# Patient Record
Sex: MALE | Race: WHITE
[De-identification: names, ages, dates, MRNs, and addresses within clinical notes are randomized per-mention and may not be internally consistent; named-entity substitution may affect disease eponyms.]

---

## 2017-06-02 ENCOUNTER — HOSPITAL ENCOUNTER (EMERGENCY)
Dept: HOSPITAL 53 - M ED | Age: 37
Discharge: HOME | End: 2017-06-02
Payer: COMMERCIAL

## 2017-06-02 VITALS — DIASTOLIC BLOOD PRESSURE: 85 MMHG | SYSTOLIC BLOOD PRESSURE: 153 MMHG

## 2017-06-02 DIAGNOSIS — T18.128A: Primary | ICD-10-CM
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
## 2017-06-02 LAB
ANION GAP SERPL CALC-SCNC: 8 MEQ/L (ref 8–16)
BASOPHILS # BLD AUTO: 0.1 K/MM3 (ref 0–0.2)
BASOPHILS NFR BLD AUTO: 0.8 % (ref 0–1)
BUN SERPL-MCNC: 16 MG/DL (ref 7–18)
CALCIUM SERPL-MCNC: 9.2 MG/DL (ref 8.5–10.1)
CHLORIDE SERPL-SCNC: 104 MEQ/L (ref 98–107)
CO2 SERPL-SCNC: 25 MEQ/L (ref 21–32)
CREAT SERPL-MCNC: 1.03 MG/DL (ref 0.7–1.3)
EOSINOPHIL # BLD AUTO: 0.2 K/MM3 (ref 0–0.5)
EOSINOPHIL NFR BLD AUTO: 2.9 % (ref 0–3)
ERYTHROCYTE [DISTWIDTH] IN BLOOD BY AUTOMATED COUNT: 12.8 % (ref 11.5–14.5)
GFR SERPL CREATININE-BSD FRML MDRD: > 60 ML/MIN/{1.73_M2} (ref 60–?)
GLUCOSE SERPL-MCNC: 107 MG/DL (ref 70–105)
LARGE UNSTAINED CELL #: 0.1 K/MM3 (ref 0–0.4)
LARGE UNSTAINED CELL %: 1.4 % (ref 0–4)
LYMPHOCYTES # BLD AUTO: 1.5 K/MM3 (ref 1.5–4.5)
LYMPHOCYTES NFR BLD AUTO: 22.2 % (ref 24–44)
MCH RBC QN AUTO: 34.5 PG (ref 27–33)
MCHC RBC AUTO-ENTMCNC: 36.9 G/DL (ref 32–36.5)
MCV RBC AUTO: 93.5 FL (ref 80–96)
MONOCYTES # BLD AUTO: 0.5 K/MM3 (ref 0–0.8)
MONOCYTES NFR BLD AUTO: 8.1 % (ref 0–5)
NEUTROPHILS # BLD AUTO: 4.2 K/MM3 (ref 1.8–7.7)
NEUTROPHILS NFR BLD AUTO: 64.6 % (ref 36–66)
PLATELET # BLD AUTO: 190 K/MM3 (ref 150–450)
POTASSIUM SERPL-SCNC: 4.1 MEQ/L (ref 3.5–5.1)
SODIUM SERPL-SCNC: 137 MEQ/L (ref 136–145)
WBC # BLD AUTO: 6.5 K/MM3 (ref 4–10)

## 2017-06-02 NOTE — REP
CHEST, TWO VIEWS:

 

HISTORY: Choked on sausage.

 

COMPARISON: None.

 

FINDINGS: The superior mediastinal structures are midline. The cardiac silhouette

is unremarkable in size, shape and position. The diaphragmatic surfaces of the

lungs are regular and the costophrenic angles are clear. The pulmonary fields are

clear. The imaged osseous structures are intact.

 

IMPRESSION:

 

There is no acute cardiopulmonary disease.

 

 

Signed by

Juan Miguel Valderrama DO 06/02/2017 04:32 P

## 2017-06-03 NOTE — ECGEPIP
Stationary ECG Study

                           [facility] - ED

                                       

                                       Test Date:    2017

Pat Name:     DARLIN DAVIDSON          Department:   

Patient ID:   [id number]                 Room:         -

Gender:       M                        Technician:   JB

:          1980               Requested By: JIM ZAMORA

Order Number: [id number]     Reading MD:   Josie Field

                                 Measurements

Intervals                              Axis          

Rate:         79                       P:            53

SC:           134                      QRS:          16

QRSD:         93                       T:            59

QT:           351                                    

QTc:          403                                    

                           Interpretive Statements

SINUS RHYTHM

NONSPECIFIC T-WAVE ABNORMALITY

NO PRIOR FOR COMPARISON

Electronically Signed On 6-3-2017 7:27:23 EDT by Josie Field

## 2021-10-09 ENCOUNTER — HOSPITAL ENCOUNTER (EMERGENCY)
Dept: HOSPITAL 53 - M ED | Age: 41
LOS: 1 days | Discharge: HOME | End: 2021-10-10
Payer: COMMERCIAL

## 2021-10-09 VITALS — WEIGHT: 305.34 LBS | HEIGHT: 71 IN | BODY MASS INDEX: 42.75 KG/M2

## 2021-10-09 DIAGNOSIS — R03.0: ICD-10-CM

## 2021-10-09 DIAGNOSIS — Z79.899: ICD-10-CM

## 2021-10-09 DIAGNOSIS — R05.9: Primary | ICD-10-CM

## 2021-10-09 DIAGNOSIS — U07.1: ICD-10-CM

## 2021-10-09 PROCEDURE — 71275 CT ANGIOGRAPHY CHEST: CPT

## 2021-10-09 PROCEDURE — 96360 HYDRATION IV INFUSION INIT: CPT

## 2021-10-09 PROCEDURE — 71045 X-RAY EXAM CHEST 1 VIEW: CPT

## 2021-10-09 PROCEDURE — 96361 HYDRATE IV INFUSION ADD-ON: CPT

## 2021-10-09 PROCEDURE — 99284 EMERGENCY DEPT VISIT MOD MDM: CPT

## 2021-10-09 PROCEDURE — 93005 ELECTROCARDIOGRAM TRACING: CPT

## 2021-10-09 PROCEDURE — 84484 ASSAY OF TROPONIN QUANT: CPT

## 2021-10-09 PROCEDURE — 80047 BASIC METABLC PNL IONIZED CA: CPT

## 2021-10-09 NOTE — REP
INDICATION:

sob



COMPARISON:

06/02/2017



TECHNIQUE:

Portable AP view of the chest



FINDINGS:

The mediastinum and cardiac silhouette are stable and within normal limits for

portable technique.  The lung fields are clear without acute consolidation, effusion,

or pneumothorax.  Skeletal structures are intact.



IMPRESSION:

No acute cardiopulmonary process appreciated.





<Electronically signed by Say Trevizo > 10/09/21 [id number]

## 2021-10-09 NOTE — HPEPDOC
General


Date of Admission


10/9/21


Date of Service:  Oct 9, 2021


Chief Complaint


URI type symptoms


Source:  Patient





History of Present Illness


Mr. Reilly is a 41-year-old male with significant medical history of 

prehypertension and obesity.  Patient reports his chief complaints are URI type 

symptoms.  He describes headache, fatigue, malaise, sinus congestion, dry cough 

and mild sensation of shortness of breath with exertion x7 days.  Patient does 

report that he has been in contact with Covid positive individual, his fiance 

on Saturday.  He was tested outpatient at [facility], however it was negative. His 

s/s worsened over the past week and thus he decided to come to ED.  PCR positive

Covid.  





Fortunately, patient is tolerating room air 98%.  His ambulation test on room 

air showed no desaturation. 


Chest x-ray without infiltrate or consolidation.  Patient reports that he does 

not take any medications at home.  He arrives hypertensive 175/103 and 

tachycardic 100-118.  Given his shortness of breath and tachycardia, patient 

underwent a CTA chest to ensure no PE.  Patient reports that he has not seen a 

PCP in several years and in the past he has just been prehypertensive.  





CTA chest confirmed viral pneumonitis and no emboli.  Patient blood pressure did

trend down post /94.  Patient does describe some understandable anxiety 

while in ER.  Patient was found to be febrile T102 Fahrenheit during the 

tachycardia and likely this drove up heart rate.  Patient was given Tylenol and 

temperature decreased and heart rate normalized.  





Patient is interested in monoclonal antibodies.  We will schedule for monoclonal

antibodies and patient encouraged for PCP visit for treatment of his 

hypertension.





Home Medications


Scheduled PRN


Albuterol Sulfate (Proair Hfa) 8.5 Gm Hfa.aer.ad, 2 PUFF INH Q4-6HP PRN for 

wheezing





Allergies


Coded Allergies:  


     No Known Allergies (Unverified , 6/2/17)





Past Medical History


Medical History


Obesity and prehypertension


Surgical History


Denies





Family History


Significant Family History:  Hypertension





Social History


* Smoker:  Denies


Alcohol:  Denies


Drugs:  denies


Recent Travel/Sick Contacts:  Denies: Recent travel, Recent sick contacts


Psychosocial History:  No pertinent psych hx





A-FIB/CHADSVASC


A-FIB History


Current/History of A-Fib/PAF?:  No


Current PO Anticoag Therapy:  No





Review of Systems


Constitutional:  Reports: Fever, Night Sweats, Weakness, Fatigue; 


   Denies: Chills


Eyes:  Denies: Pain, Vision change


ENT:  Reports: Head Aches, Sinus Congestion; 


   Denies: Ear Pain, Dysphagia


Skin:  Denies: Rash, Lesions, Breakdown


Pulmonary:  Reports: Dyspnea, Cough


Cardiovascular:  Denies: Chest Pain, Palpitations, Orthopnea, Paroxysmal Noc. 

Dyspnea, Lt Headedness


Gastrointestinal:  Denies: Nausea, Vomiting, Abdominal Pain, Diarrhea


Genitourinary:  Denies: Dysuria, Frequency, Incontinence, Retention


Hematologic:  Denies: Bruising, Bleeding Excessively


Musculoskeletal:  Denies: Neck Pain, Back Pain, Joint Pain, Muscle Pain, Spasms


Neurological:  Denies: Weakness, Numbness, Change in speech, Confusion


Psych:  Reports: Mood Normal; 


   Denies: Depression, Memory Issues





Physical Examination


General Exam:  Positive: Alert, Cooperative, No Acute Distress


Eye Exam:  Positive: PERRLA, Conjunctiva & lids normal, EOMI; 


   Negative: Sclera icteric


ENT Exam:  Positive: Atraumatic, Mucous membr. moist/pink, Pharynx Normal


Neck Exam:  Positive: Supple; 


   Negative: JVD, thyromegaly


Chest Exam:  Positive: Normal air movement


Heart Exam:  Positive: Tachycardic, Regular Rhythm, Normal S1, Normal S2; 


   Negative: Murmurs, Rubs


Telemetry:  Positive: No significant arrhythmia


Abdomen Exam:  Positive: Normal bowel sounds, Soft; 


   Negative: Tenderness, Hepatospenomegaly


Extremity Exam:  Positive: Normal pulses; 


   Negative: Clubbing, Cyanosis, Edema


Skin Exam:  Positive: Nl turgor and temperature; 


   Negative: Breakdown, Lesion


Neuro Exam:  Positive: Normal Gait, Normal Speech, Cranial Nerves 3-12 NL, 

Reflexes 2+


Psych Exam:  Positive: Mental status NL, Mood NL, Oriented x 3





Vital Signs


Respiratory rate 18, T100.8, heart rate 107, blood pressure 158/94





 Assessment/Plan


1.  Covid positive:


-Monitor pt, respiratory status. 


-Premedications Benadryl and Tylenol


-Monoclonal antibody infusion per protocol 


-PRN solumedrol/ albuterol/ benadryl if pt with reactivity


-Discharge with written instructions for home monitoring once infusion complete 

per protocol





2.  Hypertension: Patient encouraged for follow-up with PCP and to follow low-

sodium heart healthy diet. 





3.  Obesity: Complicates care





DVT prophylaxis: Early ambulation


CODE STATUS: Full


Disposition: Home once infusion complete per policy guidelines





Plan / VTE


VTE Prophylaxis Ordered?:  No


VTE Exclusion Mechanical Proph:  Low Risk for VTE











RASHAD SOTO NP         Oct 9, 2021 23:13


EDUIN HEREDIA MD                Oct 11, 2021 05:13

## 2021-10-09 NOTE — REPVR
PROCEDURE INFORMATION: 

Exam: CTA Chest With Contrast 

Exam date and time: 10/9/2021 8:51 PM 

Age: 41 years old 

Clinical indication: Other: Tachycardia; Additional info: Tachycardia, covid 

R/O pe 



TECHNIQUE: 

Imaging protocol: Computed tomographic angiography of the chest with contrast. 

3D rendering (Not supervised by radiologist): MIP and/or 3D reconstructed 

images were created by the technologist. 

Radiation optimization: All CT scans at this facility use at least one of these 

dose optimization techniques: automated exposure control; mA and/or kV 

adjustment per patient size (includes targeted exams where dose is matched to 

clinical indication); or iterative reconstruction. 

Contrast material: ISOVUE 370; Contrast volume: 75 ml; Contrast route: 

INTRAVENOUS (IV);  



COMPARISON: 

1. CR Chest, 1 view 2021-10-09 17:13 

2. CR Chest, 2 view PA, Lat 2017-06-02 15:41 



FINDINGS: 

Pulmonary arteries: No filling defects in the pulmonary arteries to suggest 

pulmonary emboli. 

Aorta: Unremarkable. No aortic aneurysm. No aortic dissection. 



Lungs: Patchy peripheral lung infiltrates, evidence for early/mild moderate 

atypical viral pneumonitis. 

Pleural spaces: Unremarkable. No pneumothorax. No pleural effusion. 

Heart: Unremarkable. No cardiomegaly. No pericardial effusion. 

Lymph nodes: Mild mediastinal/hilar adenopathy. 



Liver: Enlarged low attenuating liver, evidence of hepatic steatosis. 

Bones/joints: Unremarkable. No acute fracture. 

Soft tissues: Unremarkable. 



IMPRESSION: 

1. Patchy peripheral lung infiltrates, evidence for early/mild moderate 

atypical viral pneumonitis. 

2. No filling defects in the pulmonary arteries to suggest pulmonary emboli. 



Electronically signed by: Gilbert Monk On 10/09/2021  22:56:17 PM

## 2021-10-10 VITALS — SYSTOLIC BLOOD PRESSURE: 140 MMHG | DIASTOLIC BLOOD PRESSURE: 93 MMHG

## 2021-10-10 NOTE — ECGEPIP
[facility] - ED

                                       

                                       Test Date:    2021-10-09

Pat Name:     DARLIN DAVIDSON          Department:   

Patient ID:   [id number]                 Room:         -

Gender:       Male                     Technician:   MB

:          1980               Requested By: RYAN MONTERROSO PA-C.

Order Number: [id number]     Reading MD:   Josie Field

                                 Measurements

Intervals                              Axis          

Rate:         114                      P:            33

ID:           134                      QRS:          -6

QRSD:         82                       T:            53

QT:           308                                    

QTc:          424                                    

                           Interpretive Statements

Sinus tachycardia

increased rate 17

Electronically Signed on 10- 19:11:51 EDT by Josie Field

## 2021-10-11 ENCOUNTER — HOSPITAL ENCOUNTER (INPATIENT)
Dept: HOSPITAL 53 - M ED | Age: 41
LOS: 8 days | Discharge: HOME | DRG: 137 | End: 2021-10-19
Attending: INTERNAL MEDICINE | Admitting: INTERNAL MEDICINE
Payer: COMMERCIAL

## 2021-10-11 ENCOUNTER — HOSPITAL ENCOUNTER (OUTPATIENT)
Dept: HOSPITAL 53 - M OPCLI4PR | Age: 41
Discharge: HOME | End: 2021-10-11
Attending: INTERNAL MEDICINE
Payer: COMMERCIAL

## 2021-10-11 VITALS — DIASTOLIC BLOOD PRESSURE: 68 MMHG | SYSTOLIC BLOOD PRESSURE: 139 MMHG

## 2021-10-11 VITALS — DIASTOLIC BLOOD PRESSURE: 68 MMHG | SYSTOLIC BLOOD PRESSURE: 133 MMHG

## 2021-10-11 VITALS — SYSTOLIC BLOOD PRESSURE: 143 MMHG | DIASTOLIC BLOOD PRESSURE: 80 MMHG

## 2021-10-11 VITALS — WEIGHT: 304.02 LBS | BODY MASS INDEX: 42.56 KG/M2 | HEIGHT: 71 IN

## 2021-10-11 DIAGNOSIS — U07.1: Primary | ICD-10-CM

## 2021-10-11 DIAGNOSIS — E66.9: ICD-10-CM

## 2021-10-11 DIAGNOSIS — J12.82: ICD-10-CM

## 2021-10-11 DIAGNOSIS — I16.0: ICD-10-CM

## 2021-10-11 DIAGNOSIS — Z79.899: ICD-10-CM

## 2021-10-11 DIAGNOSIS — J96.01: ICD-10-CM

## 2021-10-11 DIAGNOSIS — I10: ICD-10-CM

## 2021-10-11 LAB
ALBUMIN SERPL BCG-MCNC: 3.2 GM/DL (ref 3.2–5.2)
ALT SERPL W P-5'-P-CCNC: 51 U/L (ref 12–78)
APTT BLD: 35.3 SECONDS (ref 25.9–37)
BASOPHILS # BLD AUTO: 0 10^3/UL (ref 0–0.2)
BASOPHILS NFR BLD AUTO: 0.2 % (ref 0–1)
BILIRUB SERPL-MCNC: 0.5 MG/DL (ref 0.2–1)
BUN SERPL-MCNC: 15 MG/DL (ref 7–18)
CALCIUM SERPL-MCNC: 7.9 MG/DL (ref 8.5–10.1)
CHLORIDE SERPL-SCNC: 111 MEQ/L (ref 98–107)
CK MB CFR.DF SERPL CALC: 0.48
CK MB SERPL-MCNC: < 1 NG/ML (ref ?–3.6)
CK SERPL-CCNC: 208 U/L (ref 39–308)
CO2 SERPL-SCNC: 21 MEQ/L (ref 21–32)
CREAT SERPL-MCNC: 0.91 MG/DL (ref 0.7–1.3)
CRP SERPL-MCNC: 17.4 MG/DL (ref 0–0.3)
D DIMER PPP DDU-MCNC: 962.64 NG/ML (ref ?–500)
EOSINOPHIL # BLD AUTO: 0 10^3/UL (ref 0–0.5)
EOSINOPHIL NFR BLD AUTO: 0 % (ref 0–3)
FERRITIN SERPL-MCNC: 2451 NG/ML (ref 26–388)
FIBRINOGEN PPP-MCNC: 826 MG/DL (ref 268–480)
GFR SERPL CREATININE-BSD FRML MDRD: > 60 ML/MIN/{1.73_M2} (ref 60–?)
GLUCOSE SERPL-MCNC: 103 MG/DL (ref 70–100)
HCT VFR BLD AUTO: 41.3 % (ref 42–52)
HGB BLD-MCNC: 14.4 G/DL (ref 13.5–17.5)
INR PPP: 1.07
LDH SERPL L TO P-CCNC: 480 U/L (ref 87–241)
LYMPHOCYTES # BLD AUTO: 0.5 10^3/UL (ref 1.5–5)
LYMPHOCYTES NFR BLD AUTO: 10 % (ref 24–44)
MAGNESIUM SERPL-MCNC: 1.9 MG/DL (ref 1.8–2.4)
MCH RBC QN AUTO: 34.3 PG (ref 27–33)
MCHC RBC AUTO-ENTMCNC: 34.9 G/DL (ref 32–36.5)
MCV RBC AUTO: 98.3 FL (ref 80–96)
MONOCYTES # BLD AUTO: 0.2 10^3/UL (ref 0–0.8)
MONOCYTES NFR BLD AUTO: 3.8 % (ref 2–8)
NEUTROPHILS # BLD AUTO: 4 10^3/UL (ref 1.5–8.5)
NEUTROPHILS NFR BLD AUTO: 85.6 % (ref 36–66)
PLATELET # BLD AUTO: 129 10^3/UL (ref 150–450)
POTASSIUM SERPL-SCNC: 3.9 MEQ/L (ref 3.5–5.1)
PROT SERPL-MCNC: 6.8 GM/DL (ref 6.4–8.2)
PROTHROMBIN TIME: 14.3 SECONDS (ref 12.7–14.5)
RBC # BLD AUTO: 4.2 10^6/UL (ref 4.3–6.1)
SODIUM SERPL-SCNC: 140 MEQ/L (ref 136–145)
TROPONIN I SERPL-MCNC: < 0.02 NG/ML (ref ?–0.1)
WBC # BLD AUTO: 4.7 10^3/UL (ref 4–10)

## 2021-10-11 PROCEDURE — 3E0333Z INTRODUCTION OF ANTI-INFLAMMATORY INTO PERIPHERAL VEIN, PERCUTANEOUS APPROACH: ICD-10-PCS | Performed by: EMERGENCY MEDICINE

## 2021-10-11 RX ADMIN — IPRATROPIUM BROMIDE AND ALBUTEROL SCH PUFF: 20; 100 SPRAY, METERED RESPIRATORY (INHALATION) at 18:18

## 2021-10-11 NOTE — IPNPDOC
Text Note


Date of Service


The patient was seen on 10/11/21.





VS,Iona, I+O


VS, Iona, I+O


Laboratory Tests


10/11/21 18:17











Vital Signs








  Date Time  Temp Pulse Resp B/P (MAP) Pulse Ox O2 Delivery O2 Flow Rate FiO2


 


10/11/21 17:05 100.2 104 24 147/89 (108) 90 Room Air  

















EDUIN HEREDIA MD                Oct 11, 2021 20:48

## 2021-10-11 NOTE — ECGEPIP
[facility] - ED

                                       

                                       Test Date:    2021-10-11

Pat Name:     DARLIN DAVIDSON          Department:   

Patient ID:   [id number]                 Room:         -

Gender:       Male                     Technician:   colt

:          1980               Requested By: Maja Lundborg-Gray 

Order Number: [id number]     Reading MD:   Maja Lundborg-Gray

                                 Measurements

Intervals                              Axis          

Rate:         108                      P:            28

IL:           134                      QRS:          -5

QRSD:         88                       T:            28

QT:           320                                    

QTc:          428                                    

                           Interpretive Statements

Sinus tachycardia

Nonspecific ST T wave changes 

 

cw 10/9/21 rate decreased

Nonspecific ST T wave changes

Electronically Signed on 10- 19:36:08 EDT by Maja Lundborg-Gray

## 2021-10-11 NOTE — HPEPDOC
[facility] Medical History & Physical


Date of Admission


Oct 11, 2021


Date of Service:  Oct 11, 2021


Attending Physician:  EDUIN HEREDIA MD





History and Physical


CHIEF COMPLAINT: shortness of breath





HISTORY OF PRESENT ILLNESS:


 is a pleasant 40yo male with notable PMHx of likely undiagnosed HTN, 

obesity, and recent Covid positive dx, who presented to the [facility] ED on the 

afternoon of 10/11/21 for the chief complaint of worsening shortness of breath. 

The patient's recent story dates back to 10/2 when he his fiance received a 

Covid positive test result. During the overnight into 10/3, the patient began to

become symptomatic in the form of a dry cough, sinus congestion, general 

fatigue, dyspnea on exertion, and headache. He presented to the [facility] now urgent 

care on 10/3 and reportedly received a negative PCR Covid test at that time. 

Over the intervening week, the patient's symptoms worsened as his cough remained

nonproductive, but became more frequent causing intermittent episodes of coughin

g fits. The intensity of his shortness of breath increases well, and again was 

most prominent with activity. He also had a intermittent fever that began around

10/710/8, that was usually able to be controlled by ibuprofen and Tylenol. Due 

to his worsening dyspnea on exertion, the patient presented to the ED initially 

on 10/9; it was at this point that he first tested positive for Covid via PCR.  

The patient was saturating at 98% on room air per reports and did not desaturate

significantly with ambulation test.  A chest x-ray did not show any 

consolidation or infiltrate.  The patient was hypertensive (175/103) and 

tachycardic (HR [id number]).  A CTA of the chest was done in the setting of his d

yspnea on exertion and tachycardia, which ruled out pulmonary embolus, but did 

confirm a viral pneumonitis.  After the CTA, patient's pressures improved as did

his anxiety.  The decision was made at that time for patient to be discharged 

home from the ED, but to return for monoclonal antibody administration today, 

10/11.  After the patient returned home Saturday evening, he reported an 

improvement in his breathing and that his fever had broken.  Yesterday (10/10), 

patient seemed to be doing okay, while making concerted effort to rest and push 

oral fluids.  He still had a cough and general malaise but his oxygen was 

consistently staying 93-94% on room air.  As as the evening progressed yester

day, patient's cough worsened and around 5 AM this morning, he reported 

significant dyspnea while getting out of bed.  He had an episode of nonbloody 

emesis and had what felt to be a minor fever.  Prior to coming in for his 10:30 

AM infusion, the patient had some shortness of breath while showering and 

ambulating.  When he arrived for the infusion, administration was delayed 

slightly until his hypoxia improved.  While waiting for the infusion, patient 

had a second episode of nonbloody emesis.  Patient tolerated his monoclonal's 

without any significant issue and returned home.  After nap, patient woke up 

around 4:15 in the afternoon and reportedly his oxygen saturation on room air 

was 86% while lying down, improving to 91% when he was prone.  With the 

considerable desaturation on room air as well as his continued dyspnea on 

exertion, the patient was driven back to the ED by his stepdaughter around 5 PM.





In the ED, patient had an elevated temperature of 100.2 and was saturating at 

90% room air.  Per reports from the ED he desaturated to 81-82% on his 

ambulation test.  He had no leukocytosis, and unremarkable lactic, but had 

significantly elevated D-dimer and fibrinogen.  A chest x-ray showed an increase

in multifocal opacities consistent with Covid pulmonary disease.  Patient was 

administered duo nebs every 20 minutes as needed for shortness of breath as well

as a single dose of 6 mg IV dexamethasone.  He was subsequently admitted under 

the care of the hospitalist service primarily for acute hypoxic respiratory 

failure secondary to novel coronavirus pneumonia.





Of note, the patient is unvaccinated against novel coronavirus.





REVIEW OF SYSTEMS:


CONSTITUTIONAL: Reports intermittent fever that has been relatively well 

controlled with Tylenol and ibuprofen.  Also reports intermittent sweats and 

general malaise x9 days.  Denies any significant chills, or recent unintentional

loss of weight.


HEENT: Denies any double vision, blurry vision, ear pain, dysphagia, or 

odynophagia


CARDIOVASCULAR: Denies chest pain, chest pressure, or palpitations


RESPIRATORY: Reports dyspnea on exertion over the past week that has worsened 

over the past 72 hours.  Also reports nonproductive cough that is worsened over 

the past few days.  He denies any hemoptysis or pleuritic chest pain.


GASTROINTESTINAL: Reports 2 episodes of nonbloody emesis earlier today, as well 

as nonbloody diarrhea; denies any abdominal pain.


GENITOURINARY: Denies any dysuria or hematuria


ENDOCRINE: Denies any significant heat or cold intolerance


HEMATOLOGIC: Denies any easy bleeding or bruising recently


LYMPHATIC: Denies any new lumps or bumps.





PAST MEDICAL/SURGICAL HISTORY:


Patient reports he was previously treated for hypertension roughly 20 years ago 

with lisinopril, but that he has been off medications since then; based on his 

hypertension during 10/9 presentation and this presentation, patient likely has 

undiagnosed hypertension currently.


Obesity, BMI 42.4


Positive Covid (tested negative on 10/3 [PCR] at [facility] now urgent care, tested 

positive on 10/9 [PCR] in the ED; receive monoclonal antibody treatment today, 

10/11)


[place] teeth extraction x4





SOCIAL HISTORY:


Patient is engaged and lives in [place], [place].  He is a  for 

[employer].


Patient reports drinking 1-2 cocktails per evening with dinner and drinking 

slightly more on the weekends.


Patient denies any current or former tobacco product use or illicit drug/IV drug

 use.





FAMILY HISTORY:


Father: Living; atrial fibrillation, hypertension


Mother: Living; hypertension


Patient has 1 sister who is no significant medical issues; he also has both a 

son and daughter who have no significant medical history as well.


Patient denies any recent travel.  As mentioned above, his fiance did test 

positive for the novel coronavirus on 10/2 and required inpatient 

hospitalization at [facility] (she has since been discharged).





ALLERGIES: Please see below.





HOME MEDICATIONS: Please see below. 





PHYSICAL EXAMINATION


GENERAL APPEARANCE: Pleasant moderately obese  male lying upright in ED

 bed.  He is wearing nasal cannula oxygen.  While appearing fatigued, he does 

not appear to be in any acute distress.


HEENT: Normocephalic, atraumatic.  Noninjected, anicteric sclera.  No 

significant conjunctival pallor.  PERRLA.  EOMI.  Wearing nasal cannula oxygen.


Neck: Wide.  Supple.  No lymphadenopathy appreciated.  Trachea midline


CARDIOVASCULAR: Tachycardic rate, regular rhythm.  Normal S1, S2.  No 

significant murmurs or rubs are appreciated but heart sounds are somewhat 

distant


LUNGS: Patient is currently wearing 4 L nasal cannula supplemental oxygen and 

saturating at 93%.  Breaths are shallow and there is some visible abdominal 

muscle use.  There are mild right mid lung inspiratory crackles and moderate 

bibasilar crackles appreciated posteriorly.  There is no significant wheezing or

 rhonchi appreciated.  Symmetric chest expansion.  No conversational dyspnea


ABDOMEN: Hypoactive bowel sounds throughout.  Soft, moderately obese.  Nontender

 nondistended.  No guarding or rigidity appreciated.


Skin: Mild diaphoresis of the head neck chest and back


EXTREMITIES: Bilateral lower extremities are free of pitting edema.  2+ radial 

and posterior tibial pulses bilaterally.


NEUROLOGICAL: No gross focal neurologic deficits appreciated.  Nondysarthric 

speech.


PSYCHIATRIC: Pleasant mood.  Affect appears appropriate.





LABORATORY DATA: 


10/11/21 18:17








IMAGING:


Portable chest x-ray, 10/11/2021


FINDINGS:


Patchy bilateral airspace disease considerably increased from prior examination 

and


consistent with the given history of COVID-19 pulmonary disease.  No obvious 

effusion


or pneumothorax.  Mediastinum and cardiac silhouette are stable.


IMPRESSION:


Increased multifocal opacities consistent with COVID-19 pulmonary disease.





MICROBIOLOGY: Please see below. 





ASSESSMENT & PLAN:


This is a 41-year-old male with notable history of COVID-19 (sxs began 10/3, dx 

10/9), undiagnosed hypertension, and obesity who presented to the ED on the 

afternoon of 10/11 with chief complaints of worsening dyspnea on exertion, 

increasing cough, and hypoxia on room air after receiving monoclonal antibody 

infusion earlier in the day.  Patient was subsequently admitted for acute 

hypoxic respiratory failure secondary to COVID-19 viral pneumonia.





#Acute hypoxic respiratory failure 2/2 COVID-19 pneumonia


-Pt c/o worsening dry cough, progressing dyspnea on exertion, general malaise, 

and intermittent fever for the past week-plus


-Symptoms began 10/3 (nine days ago); fiancee COVID positive 10/2; urgent care 

PCR test negative on 10/3; symptoms worsened over last week prompting 10/9 ED 

presentation (no desats on ambulation test; dc'd home); MAB tx earlier today 

(10/11)


     -After MAB treatment earlier today, patient reportedly was 86% on room air 

with return of fever, continued GEORGE and nonproductive cough, with 2 episodes of 

nonbloody emesis and nonbloody diarrhea


     -Return to ED this afternoon, found to be hypoxic on room air to 89-90%, 

with desaturation to 81-82% on ambulation test; chest x-ray showed worsening 

multifocal opacities


-As today is day 9 of sxs, patient within window for remdesivir and d

examethasone (both ordered); day team tomorrow can consider triple therapy with 

baricitinib


-O2 titration orders; continuous pulse oximetry; insensitive spirometry; 

encourage proning


-Temp 100.2 with no leukocytosis or lactic acidosis; D-dimer and fibrinogen 

elevated (962 and a 26, respectively); ferritin and CRP pending


-Initial procalcitonin less than 0.5 (0.27)


-Received DuoNeb treatment in the ED as well as one-time 6 mg IV dexamethasone; 

prn Xopenex nebs i/s/o tachycardia


-40 mg subcutaneous daily Lovenox


-Follow-up covid labs ordered


-Isolation and droplet precautions





#Elevated blood pressures -likely 2/2 discomfort from active Covid pneumonia as 

well as undiagnosed hypertension


-Patient was also hypertensive when he presented to the ED 2 days ago; was 

147/89 prior to admission today with heart rate in the low 100s


-Reportedly he was treated remotely for high blood pressure with lisinopril but 

has not been on medication for many years


-One-time IV labetalol ordered; 10 mg lisinopril daily order has been placed


-Normal kidney function on initial BMP; TSH ordered


-Patient is in the process of establishing with a PCP in [facility] to follow-up 

specifically regarding his blood pressure management





#Recent nonbloody emesis


-Patient reported 2 episodes of nonbloody emesis earlier today


-As needed Zofran ordered


-QTC on ED EKG was 428





#Recent nonbloody diarrhea


-Patient complained of episode of nonbloody diarrhea earlier today


As needed loperamide ordered





#Obesity


-BMI 42.4


-This complicates care, specifically by increasing endemic inflammation as well 

as being a risk factor related to his active Covid pneumonia





#DVT prophylaxis: Subcutaneous Lovenox





CODE STATUS: Full code





Social: Patient would like his fiance (Mariola Castillo, [phone] cell) to 

be his primary contact.  Of note, his fiance did test + on 10/2 and was 

admitted to [facility], but recently discharged.





Disposition: will need at least 2 midnight's stay





Vital Signs





Vital Signs








  Date Time  Temp Pulse Resp B/P (MAP) Pulse Ox O2 Delivery O2 Flow Rate FiO2


 


10/11/21 17:05 100.2 104 24 147/89 (108) 90 Room Air  











Laboratory Data


Labs 24H


Laboratory Tests 2


10/11/21 18:06: Procalcitonin 0.27


10/11/21 18:17: 


Immature Granulocyte % (Auto) 0.4, Neutrophils (%) (Auto) 85.6H, Lymphocytes (%)

 (Auto) 10.0L, Monocytes (%) (Auto) 3.8, Eosinophils (%) (Auto) 0.0, Basophils 

(%) (Auto) 0.2, Neutrophils # (Auto) 4.0, Lymphocytes # (Auto) 0.5L, Monocytes #

 (Auto) 0.2, Eosinophils # (Auto) 0.0, Basophils # (Auto) 0.0, Nucleated Red 

Blood Cells % (auto) 0.0, Prothrombin Time 14.3H, Prothromb Time International 

Ratio 1.07, Activated Partial Thromboplast Time 35.3, Fibrinogen 826H, D-Dimer, 

Quantitative 962.64H, Lactic Acid Level 1.5


CBC/BMP


Laboratory Tests


10/11/21 18:17








Microbiology





Microbiology


10/11/21 Blood Culture, Received


           Pending


10/11/21 Blood Culture, Received


           Pending





Home Medications


Scheduled


Ascorbic Acid (Vitamin C) 500 Mg Capsule, 500 MG PO DAILY


Cholecalciferol (Vitamin D3) (Vitamin D3) 1,000 Unit Tablet, 1,000 UNITS PO 

DAILY


Multivitamins (Thera M Plus Tablet) 1 Each Tablet, 1 TAB PO DAILY


Zinc Sulfate (Zinc Sulfate) 220 Mg Capsule, 220 MG PO DAILY





Scheduled PRN


Albuterol Sulfate (Albuterol Sulfate Hfa) 8.5 Gm Hfa.aer.ad, 1 PUFF INH QID PRN 

for SOB/WHEEZING





Allergies


Coded Allergies:  


     No Known Allergies (Unverified , 6/2/17)





Attending Note


Attending Note


Time of service 855PM





 is a 41 yr old M who was diagnosed w COVID on Sat, had MAB infusion 

earlier on today but returned bc of progressively worsening dyspnea and will now

 be admitted for Sepsis, hypoxemia 2/W2 COVID PNA & HTN urgency. - we will place

 orders in the COVID order set, order IV labetalol for the HTN urgency





Rest per Dr.Schwarzs SOLORIO&JORDYN HAMMOND D.O.           Oct 11, 2021 20:54


EDUIN HEREDIA MD                Oct 11, 2021 21:52

## 2021-10-11 NOTE — REP
INDICATION:

Coronavirus workup



COMPARISON:

10/09/2021



TECHNIQUE:

Portable AP view of the chest



FINDINGS:

Patchy bilateral airspace disease considerably increased from prior examination and

consistent with the given history of COVID-19 pulmonary disease.  No obvious effusion

or pneumothorax.  Mediastinum and cardiac silhouette are stable.



IMPRESSION:

Increased multifocal opacities consistent with COVID-19 pulmonary disease.





<Electronically signed by Say Trevizo > 10/11/21 [id number]

## 2021-10-12 VITALS — DIASTOLIC BLOOD PRESSURE: 75 MMHG | OXYGEN SATURATION: 95 % | SYSTOLIC BLOOD PRESSURE: 121 MMHG

## 2021-10-12 VITALS — DIASTOLIC BLOOD PRESSURE: 53 MMHG | SYSTOLIC BLOOD PRESSURE: 108 MMHG

## 2021-10-12 LAB
BASOPHILS # BLD AUTO: 0 10^3/UL (ref 0–0.2)
BASOPHILS NFR BLD AUTO: 0 % (ref 0–1)
BUN SERPL-MCNC: 20 MG/DL (ref 7–18)
CALCIUM SERPL-MCNC: 8.6 MG/DL (ref 8.5–10.1)
CHLORIDE SERPL-SCNC: 109 MEQ/L (ref 98–107)
CO2 SERPL-SCNC: 26 MEQ/L (ref 21–32)
CREAT SERPL-MCNC: 0.82 MG/DL (ref 0.7–1.3)
EOSINOPHIL # BLD AUTO: 0 10^3/UL (ref 0–0.5)
EOSINOPHIL NFR BLD AUTO: 0 % (ref 0–3)
GFR SERPL CREATININE-BSD FRML MDRD: > 60 ML/MIN/{1.73_M2} (ref 60–?)
GLUCOSE SERPL-MCNC: 146 MG/DL (ref 70–100)
HCT VFR BLD AUTO: 40.6 % (ref 42–52)
HGB BLD-MCNC: 14.3 G/DL (ref 13.5–17.5)
LYMPHOCYTES # BLD AUTO: 0.6 10^3/UL (ref 1.5–5)
LYMPHOCYTES NFR BLD AUTO: 10 % (ref 24–44)
MAGNESIUM SERPL-MCNC: 2.6 MG/DL (ref 1.8–2.4)
MCH RBC QN AUTO: 34.5 PG (ref 27–33)
MCHC RBC AUTO-ENTMCNC: 35.2 G/DL (ref 32–36.5)
MCV RBC AUTO: 97.8 FL (ref 80–96)
MONOCYTES # BLD AUTO: 0.3 10^3/UL (ref 0–0.8)
MONOCYTES NFR BLD AUTO: 4 % (ref 2–8)
NEUTROPHILS # BLD AUTO: 5.3 10^3/UL (ref 1.5–8.5)
NEUTROPHILS NFR BLD AUTO: 85.5 % (ref 36–66)
PLATELET # BLD AUTO: 178 10^3/UL (ref 150–450)
POTASSIUM SERPL-SCNC: 4.2 MEQ/L (ref 3.5–5.1)
RBC # BLD AUTO: 4.15 10^6/UL (ref 4.3–6.1)
SODIUM SERPL-SCNC: 140 MEQ/L (ref 136–145)
TSH SERPL DL<=0.005 MIU/L-ACNC: 0.26 UIU/ML (ref 0.36–3.74)
WBC # BLD AUTO: 6.2 10^3/UL (ref 4–10)

## 2021-10-12 PROCEDURE — XW033E5 INTRODUCTION OF REMDESIVIR ANTI-INFECTIVE INTO PERIPHERAL VEIN, PERCUTANEOUS APPROACH, NEW TECHNOLOGY GROUP 5: ICD-10-PCS | Performed by: STUDENT IN AN ORGANIZED HEALTH CARE EDUCATION/TRAINING PROGRAM

## 2021-10-12 RX ADMIN — Medication SCH MG: at 20:08

## 2021-10-12 RX ADMIN — IPRATROPIUM BROMIDE AND ALBUTEROL SCH PUFF: 20; 100 SPRAY, METERED RESPIRATORY (INHALATION) at 06:27

## 2021-10-12 RX ADMIN — BARICITINIB SCH MG: 2 TABLET, FILM COATED ORAL at 20:07

## 2021-10-12 RX ADMIN — DEXAMETHASONE SODIUM PHOSPHATE SCH MG: 4 INJECTION, SOLUTION INTRAMUSCULAR; INTRAVENOUS at 08:57

## 2021-10-12 RX ADMIN — ENOXAPARIN SODIUM SCH MG: 40 INJECTION SUBCUTANEOUS at 09:03

## 2021-10-12 RX ADMIN — MULTIPLE VITAMINS W/ MINERALS TAB SCH TAB: TAB at 09:03

## 2021-10-12 RX ADMIN — Medication SCH UNITS: at 08:57

## 2021-10-12 RX ADMIN — OXYCODONE HYDROCHLORIDE AND ACETAMINOPHEN SCH MG: 500 TABLET ORAL at 08:57

## 2021-10-12 NOTE — IPNPDOC
Subjective


Date Seen


The patient was seen on 10/12/21.





Subjective


Chief Complaint/HPI


Patient was seen and examined at bedside this morning. Patient had desaturated 

on 6 L nasal cannula and was switched over to a Venturi mask.  After being 

switched over he reports improvement in his breathing, but continues to feel 

short of breath with slight movement.  He also endorses having loose stools 

approximately 2 to 3/day.  Denied headaches, blurry vision, chest pain, 

abdominal pain, nausea, vomiting and problems with urination.





Objective


Physical Examination


Other physical findings


General: Lying in bed, no acute distress


Head/Neck/Throat: Trachea midline, mucous membranes moist


Eyes: Sclera anicteric, no erythema or discharge appreciated bilateral


Thorax: Normal respiratory effort on a Venturi mask heavily, lungs clear to 

auscultation bilaterally, no wheezes/rales/rhonchi


Cardiovascular: Normal rate, regular rhythm, normal S1, S2; no S3, S4, 

rubs/gallops/murmurs


Abdomen: Bowel sounds present, soft/nontender/nondistended


Genitourinary: No CVA tenderness, no Paz in place


Musculoskeletal: Moving all extremities, no edema


Skin: Warm, dry


Neurologic: AAOx3, speech fluent and goal-directed, no focal deficits, grossly 

intact





Assessment /Plan


Assessment


#Acute hypoxic respiratory failure


-Secondary to Covid pneumonia.  Continue with IV steroids, remdesivir.  

Considering he has increasing oxygen requirements, baricitinib was discussed w

ith pulmonary team and are in agreement for administering.


-Explained to the nurse if he is unable to maintain adequate oxygen saturation 

of 94 to 95% then he should be switched over to a nonrebreather and/or even 

Vapotherm.





#Hypoxia


-As above





#HTN 


-Better controlled after being started on lisinopril. 





#Seasonal allergies


-Albuterol as needed





#Obesity


-This complicates care.





#DVT prophylaxis


-Lovenox





Plan/VTE


VTE Prophylaxis Ordered?:  Yes





VS, I&O, 24H, Fishbone


Vital Signs/I&O





Vital Signs








  Date Time  Temp Pulse Resp B/P (MAP) Pulse Ox O2 Delivery O2 Flow Rate FiO2


 


10/12/21 09:00  91 26  93 Venturi Mask 15.0 50


 


10/12/21 08:45    128/73 (91)    


 


10/12/21 05:45 98.4       











Laboratory Data


24H LABS


Laboratory Tests 2


10/11/21 18:06: Procalcitonin 0.27


10/11/21 18:17: 


Immature Granulocyte % (Auto) 0.4, Neutrophils (%) (Auto) 85.6H, Lymphocytes (%)

(Auto) 10.0L, Monocytes (%) (Auto) 3.8, Eosinophils (%) (Auto) 0.0, Basophils 

(%) (Auto) 0.2, Neutrophils # (Auto) 4.0, Lymphocytes # (Auto) 0.5L, Monocytes #

(Auto) 0.2, Eosinophils # (Auto) 0.0, Basophils # (Auto) 0.0, Nucleated Red 

Blood Cells % (auto) 0.0, Prothrombin Time 14.3H, Prothromb Time International 

Ratio 1.07, Activated Partial Thromboplast Time 35.3, Fibrinogen 826H, D-Dimer, 

Quantitative 962.64H, Urine Color YELLOW, Urine Appearance HAZY, Urine pH 5.0, 

Urine Specific Gravity 1.019, Urine Protein 1+H, Urine Glucose (UA) NEGATIVE, 

Urine Ketones 1+H, Urine Blood NEGATIVE, Urine Nitrite NEGATIVE, Urine Bilirubin

NEGATIVE, Urine Urobilinogen 0.2, Urine Leukocyte Esterase NEGATIVE, Urine WBC 

(Auto) 4H, Urine RBC (Auto) 0, Urine Hyaline Casts (Auto) 0, Urine Bacteria 

(Auto) NEGATIVE, Urine Squamous Epithelial Cells 0, Urine Mucus (Auto) SMALL, 

Urine Sperm (Auto) , Anion Gap 8, Glomerular Filtration Rate > 60.0, Lactic Acid

Level 1.5, Calcium Level 7.9L, Magnesium Level 1.9, Ferritin 2451H, Total 

Bilirubin 0.5, Aspartate Amino Transf (AST/SGOT) 45H, Alanine Aminotransferase 

(ALT/SGPT) 51, Alkaline Phosphatase 55, Lactate Dehydrogenase 480H, Total 

Creatine Kinase 208, Creatine Kinase MB < 1.0, Creatine Kinase MB Relative Index

0.48, Troponin I < 0.02, C-Reactive Protein, Quantitative 17.40H, Total Protein 

6.8, Albumin 3.2, Albumin/Globulin Ratio 0.9, Thyroid Stimulating Hormone (TSH) 

0.261L


CBC/BMP


Laboratory Tests


10/11/21 18:17








Microbiology





Microbiology


10/11/21 Blood Culture, Received


           Pending


10/11/21 Blood Culture, Received


           Pending











SHARRI GERARDO M.D.           Oct 12, 2021 12:26

## 2021-10-13 VITALS — DIASTOLIC BLOOD PRESSURE: 66 MMHG | SYSTOLIC BLOOD PRESSURE: 119 MMHG

## 2021-10-13 VITALS — SYSTOLIC BLOOD PRESSURE: 112 MMHG | DIASTOLIC BLOOD PRESSURE: 56 MMHG

## 2021-10-13 VITALS — OXYGEN SATURATION: 94 %

## 2021-10-13 VITALS — DIASTOLIC BLOOD PRESSURE: 65 MMHG | SYSTOLIC BLOOD PRESSURE: 119 MMHG

## 2021-10-13 VITALS — OXYGEN SATURATION: 96 %

## 2021-10-13 LAB
ALBUMIN SERPL BCG-MCNC: 2.6 GM/DL (ref 3.2–5.2)
ALT SERPL W P-5'-P-CCNC: 69 U/L (ref 12–78)
APTT BLD: 37.4 SECONDS (ref 25.9–37)
BASOPHILS # BLD AUTO: 0 10^3/UL (ref 0–0.2)
BASOPHILS NFR BLD AUTO: 0 % (ref 0–1)
BILIRUB CONJ SERPL-MCNC: 0.2 MG/DL (ref 0–0.2)
BILIRUB SERPL-MCNC: 0.4 MG/DL (ref 0.2–1)
BUN SERPL-MCNC: 29 MG/DL (ref 7–18)
CALCIUM SERPL-MCNC: 8.3 MG/DL (ref 8.5–10.1)
CHLORIDE SERPL-SCNC: 109 MEQ/L (ref 98–107)
CK SERPL-CCNC: 492 U/L (ref 39–308)
CO2 SERPL-SCNC: 26 MEQ/L (ref 21–32)
CREAT SERPL-MCNC: 0.86 MG/DL (ref 0.7–1.3)
EOSINOPHIL # BLD AUTO: 0 10^3/UL (ref 0–0.5)
EOSINOPHIL NFR BLD AUTO: 0 % (ref 0–3)
FERRITIN SERPL-MCNC: 3209 NG/ML (ref 26–388)
FIBRINOGEN PPP-MCNC: 722 MG/DL (ref 268–480)
GFR SERPL CREATININE-BSD FRML MDRD: > 60 ML/MIN/{1.73_M2} (ref 60–?)
GLUCOSE SERPL-MCNC: 118 MG/DL (ref 70–100)
HCT VFR BLD AUTO: 39 % (ref 42–52)
HGB BLD-MCNC: 13.6 G/DL (ref 13.5–17.5)
INR PPP: 1.09
LDH SERPL L TO P-CCNC: 610 U/L (ref 87–241)
LYMPHOCYTES # BLD AUTO: 1.1 10^3/UL (ref 1.5–5)
LYMPHOCYTES NFR BLD AUTO: 18.1 % (ref 24–44)
MAGNESIUM SERPL-MCNC: 2.5 MG/DL (ref 1.8–2.4)
MCH RBC QN AUTO: 34.1 PG (ref 27–33)
MCHC RBC AUTO-ENTMCNC: 34.9 G/DL (ref 32–36.5)
MCV RBC AUTO: 97.7 FL (ref 80–96)
MONOCYTES # BLD AUTO: 0.4 10^3/UL (ref 0–0.8)
MONOCYTES NFR BLD AUTO: 6 % (ref 2–8)
NEUTROPHILS # BLD AUTO: 4.4 10^3/UL (ref 1.5–8.5)
NEUTROPHILS NFR BLD AUTO: 75.6 % (ref 36–66)
NT-PRO BNP: 75 PG/ML (ref ?–125)
PHOSPHATE SERPL-MCNC: 2.6 MG/DL (ref 2.5–4.9)
PLATELET # BLD AUTO: 204 10^3/UL (ref 150–450)
POTASSIUM SERPL-SCNC: 4.1 MEQ/L (ref 3.5–5.1)
PROT SERPL-MCNC: 7.1 GM/DL (ref 6.4–8.2)
PROTHROMBIN TIME: 14.5 SECONDS (ref 12.7–14.5)
RBC # BLD AUTO: 3.99 10^6/UL (ref 4.3–6.1)
SODIUM SERPL-SCNC: 140 MEQ/L (ref 136–145)
TROPONIN I SERPL-MCNC: < 0.02 NG/ML (ref ?–0.1)
WBC # BLD AUTO: 5.8 10^3/UL (ref 4–10)

## 2021-10-13 RX ADMIN — SODIUM CHLORIDE SCH MLS/HR: 9 INJECTION, SOLUTION INTRAVENOUS at 05:37

## 2021-10-13 RX ADMIN — Medication SCH MG: at 08:26

## 2021-10-13 RX ADMIN — BARICITINIB SCH MG: 2 TABLET, FILM COATED ORAL at 20:48

## 2021-10-13 RX ADMIN — SODIUM CHLORIDE SCH ML: 9 INJECTION, SOLUTION INTRAMUSCULAR; INTRAVENOUS; SUBCUTANEOUS at 06:50

## 2021-10-13 RX ADMIN — MULTIPLE VITAMINS W/ MINERALS TAB SCH TAB: TAB at 08:27

## 2021-10-13 RX ADMIN — Medication SCH UNITS: at 08:26

## 2021-10-13 RX ADMIN — DEXAMETHASONE SODIUM PHOSPHATE SCH MG: 4 INJECTION, SOLUTION INTRAMUSCULAR; INTRAVENOUS at 08:26

## 2021-10-13 RX ADMIN — ENOXAPARIN SODIUM SCH MG: 40 INJECTION SUBCUTANEOUS at 08:27

## 2021-10-13 RX ADMIN — OXYCODONE HYDROCHLORIDE AND ACETAMINOPHEN SCH MG: 500 TABLET ORAL at 08:26

## 2021-10-13 NOTE — IPNPDOC
Subjective


Date Seen


The patient was seen on 10/13/21.





Subjective


Chief Complaint/HPI


Patient was seen and examined at bedside this morning.  He reported feeling 

short of breath when he moves even in bed.  However, he felt as if his breathing

is improving and also is no longer having loose stools.  He denied headaches, 

chest pain, palpitations, abdominal pain, nausea, vomiting and problems with 

urination.





Objective


Physical Examination


Other physical findings


General: Lying in bed, no acute distress


Head/Neck/Throat: Trachea midline, mucous membranes moist


Eyes: Sclera anicteric, no erythema or discharge appreciated bilateral


Thorax: Normal respiratory effort on a Venturi mask heavily, lungs clear to 

auscultation bilaterally, no wheezes/rales/rhonchi


Cardiovascular: Normal rate, regular rhythm, normal S1, S2; no S3, S4, 

rubs/gallops/murmurs


Abdomen: Bowel sounds present, soft/nontender/nondistended


Genitourinary: No CVA tenderness, no Paz in place


Musculoskeletal: Moving all extremities, no edema


Skin: Warm, dry


Neurologic: AAOx3, speech fluent and goal-directed, no focal deficits, grossly 

intact





Assessment /Plan


Assessment


#Acute hypoxic respiratory failure


-Secondary to Covid pneumonia.  Continue with IV steroids, remdesivir, and 

baricitinib


-oxygen protocol with sp02 goal of 94 to 95%. In the event he is to desaturate 

he will be switched over to a Venturi, nonrebreather and/or even Vapotherm.





#Hypoxia


-As above





#HTN 


-Better controlled after being started on lisinopril. 





#Seasonal allergies


-Albuterol as needed





#Obesity


-This complicates care.





#DVT prophylaxis


-Lovenox





Plan/VTE


VTE Prophylaxis Ordered?:  Yes





VS, I&O, 24H, Fishbone


Vital Signs/I&O





Vital Signs








  Date Time  Temp Pulse Resp B/P (MAP) Pulse Ox O2 Delivery O2 Flow Rate FiO2


 


10/13/21 08:30       8.0 


 


10/13/21 08:27    117/61    


 


10/13/21 06:00     96 High Flow Cannula  


 


10/13/21 04:00 98.1 78 22     


 


10/12/21 09:00        50














I&O- Last 24 Hours up to 6 AM 


 


 10/13/21





 05:59


 


Intake Total 420 ml


 


Output Total 0 ml


 


Balance 420 ml











Laboratory Data


24H LABS


Laboratory Tests 2


10/12/21 13:51: 


Immature Granulocyte % (Auto) 0.5, Neutrophils (%) (Auto) 85.5H, Lymphocytes (%)

(Auto) 10.0L, Monocytes (%) (Auto) 4.0, Eosinophils (%) (Auto) 0.0, Basophils 

(%) (Auto) 0.0, Neutrophils # (Auto) 5.3, Lymphocytes # (Auto) 0.6L, Monocytes #

(Auto) 0.3, Eosinophils # (Auto) 0.0, Basophils # (Auto) 0.0, Nucleated Red 

Blood Cells % (auto) 0.0, Anion Gap 5L, Glomerular Filtration Rate > 60.0, 

Calcium Level 8.6, Magnesium Level 2.6H


10/13/21 06:41: 


Immature Granulocyte % (Auto) 0.3, Neutrophils (%) (Auto) 75.6H, Lymphocytes (%)

(Auto) 18.1L, Monocytes (%) (Auto) 6.0, Eosinophils (%) (Auto) 0.0, Basophils 

(%) (Auto) 0.0, Neutrophils # (Auto) 4.4, Lymphocytes # (Auto) 1.1L, Monocytes #

(Auto) 0.4, Eosinophils # (Auto) 0.0, Basophils # (Auto) 0.0, Nucleated Red 

Blood Cells % (auto) 0.0, Anion Gap 5L, Glomerular Filtration Rate > 60.0, Ca

lcium Level 8.3L, Magnesium Level 2.5H, Prothrombin Time 14.5H, Prothromb Time 

International Ratio 1.09, Activated Partial Thromboplast Time 37.4, Fibrinogen 

722H, Phosphorus Level 2.6, Ferritin 3209H, Total Bilirubin 0.4, Direct 

Bilirubin 0.2, Aspartate Amino Transf (AST/SGOT) 76H, Alanine Aminotransferase 

(ALT/SGPT) 69, Alkaline Phosphatase 47, Lactate Dehydrogenase 610H, Total 

Creatine Kinase 492#H, Troponin I < 0.02, NT-Pro-B-Type Natriuretic Peptide 75, 

Total Protein 7.1, Albumin 2.6L, Albumin/Globulin Ratio 0.6


CBC/BMP


Laboratory Tests


10/12/21 13:51








10/13/21 06:41








Microbiology





Microbiology


10/11/21 Blood Culture - Preliminary, Resulted


           No growth after 24 hours . All specim...


10/11/21 Blood Culture - Preliminary, Resulted


           No growth after 24 hours . All specim...











SHARRI GERARDO M.D.           Oct 13, 2021 12:44 not applicable

## 2021-10-14 VITALS — OXYGEN SATURATION: 91 %

## 2021-10-14 VITALS — DIASTOLIC BLOOD PRESSURE: 74 MMHG | SYSTOLIC BLOOD PRESSURE: 130 MMHG

## 2021-10-14 VITALS — OXYGEN SATURATION: 95 % | DIASTOLIC BLOOD PRESSURE: 75 MMHG | SYSTOLIC BLOOD PRESSURE: 135 MMHG

## 2021-10-14 VITALS — OXYGEN SATURATION: 93 %

## 2021-10-14 VITALS — DIASTOLIC BLOOD PRESSURE: 69 MMHG | SYSTOLIC BLOOD PRESSURE: 121 MMHG

## 2021-10-14 VITALS — OXYGEN SATURATION: 94 %

## 2021-10-14 VITALS — OXYGEN SATURATION: 96 %

## 2021-10-14 LAB
BUN SERPL-MCNC: 28 MG/DL (ref 7–18)
CALCIUM SERPL-MCNC: 8.1 MG/DL (ref 8.5–10.1)
CHLORIDE SERPL-SCNC: 109 MEQ/L (ref 98–107)
CO2 SERPL-SCNC: 25 MEQ/L (ref 21–32)
CREAT SERPL-MCNC: 0.86 MG/DL (ref 0.7–1.3)
GFR SERPL CREATININE-BSD FRML MDRD: > 60 ML/MIN/{1.73_M2} (ref 60–?)
GLUCOSE SERPL-MCNC: 98 MG/DL (ref 70–100)
HCT VFR BLD AUTO: 40.2 % (ref 42–52)
HGB BLD-MCNC: 13.8 G/DL (ref 13.5–17.5)
LYMPHOCYTES NFR BLD MANUAL: 16 % (ref 16–44)
MAGNESIUM SERPL-MCNC: 2.3 MG/DL (ref 1.8–2.4)
MCH RBC QN AUTO: 33.9 PG (ref 27–33)
MCHC RBC AUTO-ENTMCNC: 34.3 G/DL (ref 32–36.5)
MCV RBC AUTO: 98.8 FL (ref 80–96)
MONOCYTES NFR BLD MANUAL: 6 % (ref 0–5)
NEUTROPHILS NFR BLD MANUAL: 71 % (ref 28–66)
PLATELET # BLD AUTO: 242 10^3/UL (ref 150–450)
PLATELET BLD QL SMEAR: NORMAL
POTASSIUM SERPL-SCNC: 4 MEQ/L (ref 3.5–5.1)
RBC # BLD AUTO: 4.07 10^6/UL (ref 4.3–6.1)
RBC MORPH BLD: NORMAL
SODIUM SERPL-SCNC: 141 MEQ/L (ref 136–145)
VARIANT LYMPHS NFR BLD MANUAL: 7 % (ref 0–5)
WBC # BLD AUTO: 6.3 10^3/UL (ref 4–10)

## 2021-10-14 RX ADMIN — OXYCODONE HYDROCHLORIDE AND ACETAMINOPHEN SCH MG: 500 TABLET ORAL at 10:20

## 2021-10-14 RX ADMIN — DEXAMETHASONE SODIUM PHOSPHATE SCH MG: 4 INJECTION, SOLUTION INTRAMUSCULAR; INTRAVENOUS at 10:20

## 2021-10-14 RX ADMIN — BARICITINIB SCH MG: 2 TABLET, FILM COATED ORAL at 22:04

## 2021-10-14 RX ADMIN — MULTIPLE VITAMINS W/ MINERALS TAB SCH TAB: TAB at 10:19

## 2021-10-14 RX ADMIN — SODIUM CHLORIDE SCH ML: 9 INJECTION, SOLUTION INTRAMUSCULAR; INTRAVENOUS; SUBCUTANEOUS at 07:03

## 2021-10-14 RX ADMIN — Medication SCH MG: at 10:20

## 2021-10-14 RX ADMIN — Medication SCH UNITS: at 10:19

## 2021-10-14 RX ADMIN — ENOXAPARIN SODIUM SCH MG: 40 INJECTION SUBCUTANEOUS at 10:20

## 2021-10-14 RX ADMIN — SODIUM CHLORIDE SCH MLS/HR: 9 INJECTION, SOLUTION INTRAVENOUS at 05:40

## 2021-10-14 NOTE — IPNPDOC
Subjective


Date Seen


The patient was seen on 10/14/21.





Subjective


Chief Complaint/HPI


Patient seen and examined at bedside this morning.  He endorses feeling short of

breath upon movement, otherwise is comfortable when he is in bed.  He has not 

had any more loose stools.  He denies chest pain, palpitations, abdominal pain, 

nausea, vomiting, problems with urination or bowel movements





Objective


Physical Examination


Other physical findings


General: Lying in bed, no acute distress


Head/Neck/Throat: Trachea midline, mucous membranes moist


Eyes: Sclera anicteric, no erythema or discharge appreciated bilateral


Thorax: On 8 L nasal cannula normal respiratory effort, lungs clear to a

uscultation bilaterally, no wheezes/rales/rhonchi


Cardiovascular: Normal rate, regular rhythm, normal S1, S2; no S3, S4, 

rubs/gallops/murmurs


Abdomen: Bowel sounds present, soft/nontender/nondistended


Genitourinary: No CVA tenderness, no Paz in place


Musculoskeletal: Moving all extremities, no edema


Skin: Warm, dry


Neurologic: AAOx3, speech fluent and goal-directed, no focal deficits, grossly 

intact





Assessment /Plan


Assessment


#Acute hypoxic respiratory failure


-Secondary to Covid pneumonia.  Continue with IV steroids, remdesivir, and 

baricitinib


-oxygen protocol with sp02 goal of 94 to 95%. In the event he is to desaturate 

he will be switched over to a Venturi, nonrebreather and/or even Vapotherm.





#Hypoxia


-As above





#HTN 


-Better controlled after being started on lisinopril. 





#Seasonal allergies


-Albuterol as needed





#Obesity


-This complicates care.





#DVT prophylaxis


-Lovenox





Plan/VTE


VTE Prophylaxis Ordered?:  Yes





VS, I&O, 24H, Fishbone


Vital Signs/I&O





Vital Signs








  Date Time  Temp Pulse Resp B/P (MAP) Pulse Ox O2 Delivery O2 Flow Rate FiO2


 


10/14/21 14:00 97.4 70 20 130/74 (92) 98 Nasal Cannula 8.0 


 


10/12/21 09:00        50














I&O- Last 24 Hours up to 6 AM 


 


 10/14/21





 05:59


 


Intake Total 2910 ml


 


Output Total 1675 ml


 


Balance 1235 ml











Laboratory Data


24H LABS


Laboratory Tests 2


10/14/21 07:15: 


Neutrophils (%) (Auto) , Nucleated Red Blood Cells % (auto) 0.0, Neutrophils 

71H, Lymphocytes (Manual) 16, Monocytes (Manual) 6H, Atypical Lymphocytes 7H, 

Red Blood Cell Morphology NORMAL, Platelet Estimate NORMAL, Anion Gap 7L, 

Glomerular Filtration Rate > 60.0, Calcium Level 8.1L, Magnesium Level 2.3


CBC/BMP


Laboratory Tests


10/14/21 07:15








Microbiology





Microbiology


10/11/21 Blood Culture - Preliminary, Resulted


           No Growth after 48 hours. All Specime...


10/11/21 Blood Culture - Preliminary, Resulted


           No Growth after 48 hours. All Specime...











SHARRI GERARDO M.D.           Oct 14, 2021 15:55

## 2021-10-15 VITALS — SYSTOLIC BLOOD PRESSURE: 138 MMHG | DIASTOLIC BLOOD PRESSURE: 76 MMHG

## 2021-10-15 VITALS — OXYGEN SATURATION: 93 %

## 2021-10-15 VITALS — SYSTOLIC BLOOD PRESSURE: 143 MMHG | DIASTOLIC BLOOD PRESSURE: 87 MMHG

## 2021-10-15 VITALS — OXYGEN SATURATION: 96 %

## 2021-10-15 VITALS — SYSTOLIC BLOOD PRESSURE: 130 MMHG | DIASTOLIC BLOOD PRESSURE: 69 MMHG

## 2021-10-15 VITALS — OXYGEN SATURATION: 95 %

## 2021-10-15 LAB
ALBUMIN SERPL BCG-MCNC: 2.7 GM/DL (ref 3.2–5.2)
ALT SERPL W P-5'-P-CCNC: 67 U/L (ref 12–78)
APTT BLD: 29.5 SECONDS (ref 25.9–37)
BASOPHILS # BLD AUTO: 0 10^3/UL (ref 0–0.2)
BASOPHILS NFR BLD AUTO: 0.1 % (ref 0–1)
BILIRUB CONJ SERPL-MCNC: 0.2 MG/DL (ref 0–0.2)
BILIRUB SERPL-MCNC: 0.6 MG/DL (ref 0.2–1)
BUN SERPL-MCNC: 23 MG/DL (ref 7–18)
CALCIUM SERPL-MCNC: 8.4 MG/DL (ref 8.5–10.1)
CHLORIDE SERPL-SCNC: 106 MEQ/L (ref 98–107)
CK SERPL-CCNC: 164 U/L (ref 39–308)
CO2 SERPL-SCNC: 28 MEQ/L (ref 21–32)
CREAT SERPL-MCNC: 0.86 MG/DL (ref 0.7–1.3)
EOSINOPHIL # BLD AUTO: 0 10^3/UL (ref 0–0.5)
EOSINOPHIL NFR BLD AUTO: 0.1 % (ref 0–3)
FERRITIN SERPL-MCNC: 1882 NG/ML (ref 26–388)
FIBRINOGEN PPP-MCNC: 598 MG/DL (ref 268–480)
GFR SERPL CREATININE-BSD FRML MDRD: > 60 ML/MIN/{1.73_M2} (ref 60–?)
GLUCOSE SERPL-MCNC: 87 MG/DL (ref 70–100)
HCT VFR BLD AUTO: 40.9 % (ref 42–52)
HGB BLD-MCNC: 14.5 G/DL (ref 13.5–17.5)
INR PPP: 1.16
LDH SERPL L TO P-CCNC: 533 U/L (ref 87–241)
LYMPHOCYTES # BLD AUTO: 1.8 10^3/UL (ref 1.5–5)
LYMPHOCYTES NFR BLD AUTO: 21.8 % (ref 24–44)
MAGNESIUM SERPL-MCNC: 2.1 MG/DL (ref 1.8–2.4)
MCH RBC QN AUTO: 34.3 PG (ref 27–33)
MCHC RBC AUTO-ENTMCNC: 35.5 G/DL (ref 32–36.5)
MCV RBC AUTO: 96.7 FL (ref 80–96)
MONOCYTES # BLD AUTO: 0.5 10^3/UL (ref 0–0.8)
MONOCYTES NFR BLD AUTO: 6.7 % (ref 2–8)
NEUTROPHILS # BLD AUTO: 5.6 10^3/UL (ref 1.5–8.5)
NEUTROPHILS NFR BLD AUTO: 69.6 % (ref 36–66)
NT-PRO BNP: 95 PG/ML (ref ?–125)
PHOSPHATE SERPL-MCNC: 3.5 MG/DL (ref 2.5–4.9)
PLATELET # BLD AUTO: 259 10^3/UL (ref 150–450)
POTASSIUM SERPL-SCNC: 4.1 MEQ/L (ref 3.5–5.1)
PROT SERPL-MCNC: 7 GM/DL (ref 6.4–8.2)
PROTHROMBIN TIME: 15.2 SECONDS (ref 12.7–14.5)
RBC # BLD AUTO: 4.23 10^6/UL (ref 4.3–6.1)
SODIUM SERPL-SCNC: 140 MEQ/L (ref 136–145)
TROPONIN I SERPL-MCNC: < 0.02 NG/ML (ref ?–0.1)
WBC # BLD AUTO: 8 10^3/UL (ref 4–10)

## 2021-10-15 RX ADMIN — SODIUM CHLORIDE SCH ML: 9 INJECTION, SOLUTION INTRAMUSCULAR; INTRAVENOUS; SUBCUTANEOUS at 06:42

## 2021-10-15 RX ADMIN — MULTIPLE VITAMINS W/ MINERALS TAB SCH TAB: TAB at 08:37

## 2021-10-15 RX ADMIN — DEXAMETHASONE SODIUM PHOSPHATE SCH MG: 4 INJECTION, SOLUTION INTRAMUSCULAR; INTRAVENOUS at 08:39

## 2021-10-15 RX ADMIN — Medication SCH UNITS: at 08:37

## 2021-10-15 RX ADMIN — Medication SCH MG: at 08:38

## 2021-10-15 RX ADMIN — BARICITINIB SCH MG: 2 TABLET, FILM COATED ORAL at 20:27

## 2021-10-15 RX ADMIN — ENOXAPARIN SODIUM SCH MG: 40 INJECTION SUBCUTANEOUS at 08:39

## 2021-10-15 RX ADMIN — OXYCODONE HYDROCHLORIDE AND ACETAMINOPHEN SCH MG: 500 TABLET ORAL at 08:37

## 2021-10-15 RX ADMIN — SODIUM CHLORIDE SCH MLS/HR: 9 INJECTION, SOLUTION INTRAVENOUS at 06:41

## 2021-10-15 NOTE — IPNPDOC
Subjective


Date Seen


The patient was seen on 10/15/21.





Subjective


Chief Complaint/HPI


Patient seen and examined at bedside this morning.  He reports feeling short of 

breath when he changes position in bed and also notes that his saturations 

dropped.  He does best when he is prone.  Otherwise, he had no new complaints.


Other systems


10 point review of system was negative except for what is noted in the HPI





Objective


Physical Examination


Other physical findings


General: Lying in bed, no acute distress


Head/Neck/Throat: Trachea midline, mucous membranes moist


Eyes: Sclera anicteric, no erythema or discharge appreciated bilateral


Thorax: On 7 L nasal cannula normal respiratory effort, lungs clear to 

auscultation bilaterally, no wheezes/rales/rhonchi


Cardiovascular: Normal rate, regular rhythm, normal S1, S2; no S3, S4, 

rubs/gallops/murmurs


Abdomen: Bowel sounds present, soft/nontender/nondistended


Genitourinary: No CVA tenderness, no Paz in place


Musculoskeletal: Moving all extremities, no edema


Skin: Warm, dry


Neurologic: AAOx3, speech fluent and goal-directed, no focal deficits, grossly 

intact





Assessment /Plan


Assessment


#Acute hypoxic respiratory failure


-Secondary to Covid pneumonia.  Continue with IV steroids, remdesivir, and 

baricitinib


-oxygen protocol with sp02 goal of 94 to 95%. In the event he is to desaturate 

he will be switched over to a Venturi, nonrebreather and/or even Vapotherm.





#Hypoxia


-As above





#HTN 


-Better controlled after being started on lisinopril. 





#Seasonal allergies


-Albuterol as needed





#Obesity


-This complicates care.





#DVT prophylaxis


-Lovenox





Plan/VTE


VTE Prophylaxis Ordered?:  Yes





VS, I&O, 24H, Fishbone


Vital Signs/I&O





Vital Signs








  Date Time  Temp Pulse Resp B/P (MAP) Pulse Ox O2 Delivery O2 Flow Rate FiO2


 


10/15/21 10:00       8.0 


 


10/15/21 08:00     95 High Flow Cannula  


 


10/15/21 06:00 97.5 52 19 143/87 (105)    


 


10/12/21 09:00        50














I&O- Last 24 Hours up to 6 AM 


 


 10/15/21





 06:00


 


Intake Total 1380 ml


 


Output Total 1325 ml


 


Balance 55 ml











Laboratory Data


24H LABS


Laboratory Tests 2


10/15/21 05:54: 


Immature Granulocyte % (Auto) 1.7, Neutrophils (%) (Auto) 69.6H, Lymphocytes (%)

(Auto) 21.8L, Monocytes (%) (Auto) 6.7, Eosinophils (%) (Auto) 0.1, Basophils (

%) (Auto) 0.1, Neutrophils # (Auto) 5.6, Lymphocytes # (Auto) 1.8, Monocytes # 

(Auto) 0.5, Eosinophils # (Auto) 0.0, Basophils # (Auto) 0.0, Nucleated Red 

Blood Cells % (auto) 0.0, Prothrombin Time 15.2H, Prothromb Time International 

Ratio 1.16, Activated Partial Thromboplast Time 29.5, Fibrinogen 598H, Anion Gap

6L, Glomerular Filtration Rate > 60.0, Calcium Level 8.4L, Phosphorus Level 

3.5#, Magnesium Level 2.1, Ferritin 1882H, Total Bilirubin 0.6, Direct Bilirubin

0.2, Aspartate Amino Transf (AST/SGOT) 41H, Alanine Aminotransferase (ALT/SGPT) 

67, Alkaline Phosphatase 56, Lactate Dehydrogenase 533H, Total Creatine Kinase 

164, Troponin I < 0.02, NT-Pro-B-Type Natriuretic Peptide 95, Total Protein 7.0,

Albumin 2.7L, Albumin/Globulin Ratio 0.6, Procalcitonin 0.06


CBC/BMP


Laboratory Tests


10/15/21 05:54








Microbiology





Microbiology


10/11/21 Blood Culture - Preliminary, Resulted


           No Growth after 72 hours. All specime...


10/11/21 Blood Culture - Preliminary, Resulted


           No Growth after 72 hours. All specime...











SHARRI GERARDO M.D.           Oct 15, 2021 15:34

## 2021-10-16 VITALS — OXYGEN SATURATION: 93 %

## 2021-10-16 VITALS — OXYGEN SATURATION: 92 %

## 2021-10-16 VITALS — SYSTOLIC BLOOD PRESSURE: 114 MMHG | DIASTOLIC BLOOD PRESSURE: 58 MMHG

## 2021-10-16 VITALS — DIASTOLIC BLOOD PRESSURE: 60 MMHG | SYSTOLIC BLOOD PRESSURE: 115 MMHG

## 2021-10-16 VITALS — OXYGEN SATURATION: 96 %

## 2021-10-16 VITALS — OXYGEN SATURATION: 94 %

## 2021-10-16 VITALS — DIASTOLIC BLOOD PRESSURE: 59 MMHG | SYSTOLIC BLOOD PRESSURE: 113 MMHG

## 2021-10-16 LAB
ALBUMIN SERPL BCG-MCNC: 2.5 GM/DL (ref 3.2–5.2)
ALT SERPL W P-5'-P-CCNC: 62 U/L (ref 12–78)
BILIRUB SERPL-MCNC: 0.8 MG/DL (ref 0.2–1)
BUN SERPL-MCNC: 18 MG/DL (ref 7–18)
CALCIUM SERPL-MCNC: 8.7 MG/DL (ref 8.5–10.1)
CHLORIDE SERPL-SCNC: 105 MEQ/L (ref 98–107)
CO2 SERPL-SCNC: 26 MEQ/L (ref 21–32)
CREAT SERPL-MCNC: 0.8 MG/DL (ref 0.7–1.3)
GFR SERPL CREATININE-BSD FRML MDRD: > 60 ML/MIN/{1.73_M2} (ref 60–?)
GLUCOSE SERPL-MCNC: 80 MG/DL (ref 70–100)
MAGNESIUM SERPL-MCNC: 1.8 MG/DL (ref 1.8–2.4)
PHOSPHATE SERPL-MCNC: 3 MG/DL (ref 2.5–4.9)
POTASSIUM SERPL-SCNC: 3.6 MEQ/L (ref 3.5–5.1)
PROT SERPL-MCNC: 6.7 GM/DL (ref 6.4–8.2)
SODIUM SERPL-SCNC: 138 MEQ/L (ref 136–145)

## 2021-10-16 RX ADMIN — OXYCODONE HYDROCHLORIDE AND ACETAMINOPHEN SCH MG: 500 TABLET ORAL at 09:10

## 2021-10-16 RX ADMIN — BARICITINIB SCH MG: 2 TABLET, FILM COATED ORAL at 20:18

## 2021-10-16 RX ADMIN — SODIUM CHLORIDE SCH ML: 9 INJECTION, SOLUTION INTRAMUSCULAR; INTRAVENOUS; SUBCUTANEOUS at 06:06

## 2021-10-16 RX ADMIN — ENOXAPARIN SODIUM SCH MG: 40 INJECTION SUBCUTANEOUS at 09:10

## 2021-10-16 RX ADMIN — DEXAMETHASONE SODIUM PHOSPHATE SCH MG: 4 INJECTION, SOLUTION INTRAMUSCULAR; INTRAVENOUS at 09:11

## 2021-10-16 RX ADMIN — MULTIPLE VITAMINS W/ MINERALS TAB SCH TAB: TAB at 09:11

## 2021-10-16 RX ADMIN — SODIUM CHLORIDE SCH MLS/HR: 9 INJECTION, SOLUTION INTRAVENOUS at 06:07

## 2021-10-16 RX ADMIN — Medication SCH MG: at 09:11

## 2021-10-16 RX ADMIN — Medication SCH UNITS: at 09:11

## 2021-10-16 NOTE — IPNPDOC
Subjective


Date Seen


The patient was seen on 10/16/21.





Subjective


Chief Complaint/HPI


Patient seen and examined at bedside this morning.  He reports his nares are dry

and has intermittent bleeding due to this.  He reports shortness of breath with 

movement in the bed, and does better while prone.


Other systems


10 point review of system was negative except for what is noted in the HPI





Objective


Physical Examination


Other physical findings


General: Lying in bed, no acute distress


Head/Neck/Throat: Trachea midline, mucous membranes moist


Eyes: Sclera anicteric, no erythema or discharge appreciated bilateral


Thorax: On 8 L nasal cannula normal respiratory effort, lungs clear to 

auscultation bilaterally, no wheezes/rales/rhonchi


Cardiovascular: Normal rate, regular rhythm, normal S1, S2; no S3, S4, 

rubs/gallops/murmurs


Abdomen: Bowel sounds present, soft/nontender/nondistended


Genitourinary: No CVA tenderness, no Paz in place


Musculoskeletal: Moving all extremities, no edema


Skin: Warm, dry


Neurologic: AAOx3, speech fluent and goal-directed, no focal deficits, grossly 

intact





Assessment /Plan


Assessment


#Acute hypoxic respiratory failure


-Secondary to Covid pneumonia.  Continue with IV steroids and baricitinib.  

Completed remdesivir.


-oxygen protocol with sp02 goal of 94 to 95%. In the event he is to desaturate 

he will be switched over to a Venturi, nonrebreather and/or even Vapotherm.


-For comfort, patient will be utilizing facemask intermittently.





#Hypoxia


-As above





#HTN 


-Better controlled after being started on lisinopril. 





#Seasonal allergies


-Albuterol as needed





#Obesity


-This complicates care.





#DVT prophylaxis


-Lovenox





Plan/VTE


VTE Prophylaxis Ordered?:  Yes





VS, I&O, 24H, Fishbone


Vital Signs/I&O





Vital Signs








  Date Time  Temp Pulse Resp B/P (MAP) Pulse Ox O2 Delivery O2 Flow Rate FiO2


 


10/16/21 18:05 98.5 70 18 115/60 (78) 91 Venturi Mask 15.0 


 


10/12/21 09:00        50














I&O- Last 24 Hours up to 6 AM 


 


 10/16/21





 06:00


 


Intake Total 2220 ml


 


Output Total 0 ml


 


Balance 2220 ml











Laboratory Data


24H LABS


Laboratory Tests 2


10/16/21 06:53: 


Anion Gap 7L, Glomerular Filtration Rate > 60.0, Calcium Level 8.7, Phosphorus 

Level 3.0, Magnesium Level 1.8, Total Bilirubin 0.8, Aspartate Amino Transf 

(AST/SGOT) 35, Alanine Aminotransferase (ALT/SGPT) 62, Alkaline Phosphatase 56, 

Total Protein 6.7, Albumin 2.5L, Albumin/Globulin Ratio 0.6


CBC/BMP


Laboratory Tests


10/16/21 06:53








Microbiology





Microbiology


10/11/21 Blood Culture - Preliminary, Resulted


           No Growth after 72 hours. All specime...


10/11/21 Blood Culture - Preliminary, Resulted


           No Growth after 72 hours. All specime...











SHARRI GERARDO M.D.           Oct 16, 2021 18:37

## 2021-10-17 VITALS — OXYGEN SATURATION: 93 %

## 2021-10-17 VITALS — SYSTOLIC BLOOD PRESSURE: 107 MMHG | DIASTOLIC BLOOD PRESSURE: 67 MMHG

## 2021-10-17 VITALS — OXYGEN SATURATION: 94 %

## 2021-10-17 VITALS — SYSTOLIC BLOOD PRESSURE: 106 MMHG | DIASTOLIC BLOOD PRESSURE: 65 MMHG

## 2021-10-17 VITALS — DIASTOLIC BLOOD PRESSURE: 58 MMHG | SYSTOLIC BLOOD PRESSURE: 107 MMHG

## 2021-10-17 VITALS — SYSTOLIC BLOOD PRESSURE: 128 MMHG | DIASTOLIC BLOOD PRESSURE: 76 MMHG

## 2021-10-17 LAB
ALBUMIN SERPL BCG-MCNC: 2.6 GM/DL (ref 3.2–5.2)
ALT SERPL W P-5'-P-CCNC: 51 U/L (ref 12–78)
APTT BLD: 29.2 SECONDS (ref 25.9–37)
BILIRUB CONJ SERPL-MCNC: 0.2 MG/DL (ref 0–0.2)
BILIRUB SERPL-MCNC: 0.7 MG/DL (ref 0.2–1)
BUN SERPL-MCNC: 16 MG/DL (ref 7–18)
CALCIUM SERPL-MCNC: 9 MG/DL (ref 8.5–10.1)
CHLORIDE SERPL-SCNC: 106 MEQ/L (ref 98–107)
CK SERPL-CCNC: 68 U/L (ref 39–308)
CO2 SERPL-SCNC: 27 MEQ/L (ref 21–32)
CREAT SERPL-MCNC: 0.77 MG/DL (ref 0.7–1.3)
FERRITIN SERPL-MCNC: 1926 NG/ML (ref 26–388)
FIBRINOGEN PPP-MCNC: 514 MG/DL (ref 268–480)
GFR SERPL CREATININE-BSD FRML MDRD: > 60 ML/MIN/{1.73_M2} (ref 60–?)
GLUCOSE SERPL-MCNC: 102 MG/DL (ref 70–100)
HCT VFR BLD AUTO: 43.1 % (ref 42–52)
HGB BLD-MCNC: 15.3 G/DL (ref 13.5–17.5)
INR PPP: 1.16
LDH SERPL L TO P-CCNC: 425 U/L (ref 87–241)
MAGNESIUM SERPL-MCNC: 2.2 MG/DL (ref 1.8–2.4)
MCH RBC QN AUTO: 34.2 PG (ref 27–33)
MCHC RBC AUTO-ENTMCNC: 35.5 G/DL (ref 32–36.5)
MCV RBC AUTO: 96.2 FL (ref 80–96)
NT-PRO BNP: 45 PG/ML (ref ?–125)
PHOSPHATE SERPL-MCNC: 3.2 MG/DL (ref 2.5–4.9)
PLATELET # BLD AUTO: 203 10^3/UL (ref 150–450)
POTASSIUM SERPL-SCNC: 4.2 MEQ/L (ref 3.5–5.1)
PROT SERPL-MCNC: 7 GM/DL (ref 6.4–8.2)
PROTHROMBIN TIME: 15.2 SECONDS (ref 12.7–14.5)
RBC # BLD AUTO: 4.48 10^6/UL (ref 4.3–6.1)
SODIUM SERPL-SCNC: 137 MEQ/L (ref 136–145)
TROPONIN I SERPL-MCNC: < 0.02 NG/ML (ref ?–0.1)
WBC # BLD AUTO: 10.7 10^3/UL (ref 4–10)

## 2021-10-17 RX ADMIN — Medication SCH UNITS: at 09:37

## 2021-10-17 RX ADMIN — BARICITINIB SCH MG: 2 TABLET, FILM COATED ORAL at 20:24

## 2021-10-17 RX ADMIN — OXYCODONE HYDROCHLORIDE AND ACETAMINOPHEN SCH MG: 500 TABLET ORAL at 09:37

## 2021-10-17 RX ADMIN — ENOXAPARIN SODIUM SCH MG: 40 INJECTION SUBCUTANEOUS at 09:40

## 2021-10-17 RX ADMIN — MULTIPLE VITAMINS W/ MINERALS TAB SCH TAB: TAB at 09:37

## 2021-10-17 RX ADMIN — DEXAMETHASONE SODIUM PHOSPHATE SCH MG: 4 INJECTION, SOLUTION INTRAMUSCULAR; INTRAVENOUS at 09:37

## 2021-10-17 RX ADMIN — Medication SCH MG: at 09:37

## 2021-10-17 NOTE — IPNPDOC
Subjective


Date Seen


The patient was seen on 10/17/21.





Subjective


Chief Complaint/HPI


Patient seen and examined at bedside this morning.  He had no new complaints 

except for feeling short of breath when he moves in bed.


Other systems


10 point review of system was negative except for what is noted in the HPI





Objective


Physical Examination


Other physical findings


General: Lying in bed, no acute distress


Head/Neck/Throat: Trachea midline, mucous membranes moist


Eyes: Sclera anicteric, no erythema or discharge appreciated bilateral


Thorax: On 8 L nasal cannula normal respiratory effort, lungs clear to 

auscultation bilaterally, no wheezes/rales/rhonchi


Cardiovascular: Normal rate, regular rhythm, normal S1, S2; no S3, S4, 

rubs/gallops/murmurs


Abdomen: Bowel sounds present, soft/nontender/nondistended


Genitourinary: No CVA tenderness, no Paz in place


Musculoskeletal: Moving all extremities, no edema


Skin: Warm, dry


Neurologic: AAOx3, speech fluent and goal-directed, no focal deficits, grossly 

intact





Assessment /Plan


Assessment


#Acute hypoxic respiratory failure


-Secondary to Covid pneumonia.  Continue with IV steroids and baricitinib.  

Completed remdesivir.


-oxygen protocol with sp02 goal of 94 to 95%. In the event he is to desaturate 

he will be switched over to a Venturi, nonrebreather and/or even Vapotherm.


-For comfort, patient will be utilizing facemask intermittently.





#Hypoxia


-As above





#HTN 


-Better controlled after being started on lisinopril. 





#Seasonal allergies


-Albuterol as needed





#Obesity


-This complicates care.





#DVT prophylaxis


-Lovenox





Plan/VTE


VTE Prophylaxis Ordered?:  Yes





VS, I&O, 24H, Fishbone


Vital Signs/I&O





Vital Signs








  Date Time  Temp Pulse Resp B/P (MAP) Pulse Ox O2 Delivery O2 Flow Rate FiO2


 


10/17/21 13:51 97.6 81 18 107/58 (74) 93 High Flow Cannula 8.0 


 


10/17/21 06:00        50














I&O- Last 24 Hours up to 6 AM 


 


 10/17/21





 06:00


 


Intake Total 1680 ml


 


Balance 1680 ml











Laboratory Data


24H LABS


Laboratory Tests 2


10/17/21 06:13: 


Nucleated Red Blood Cells % (auto) 0.2H, Prothrombin Time 15.2H, Prothromb Time 

International Ratio 1.16, Activated Partial Thromboplast Time 29.2, Fibrinogen 

514H, Anion Gap 4L, Glomerular Filtration Rate > 60.0, Calcium Level 9.0, 

Phosphorus Level 3.2, Magnesium Level 2.2, Ferritin 1926H, Total Bilirubin 0.7, 

Direct Bilirubin 0.2, Aspartate Amino Transf (AST/SGOT) 25, Alanine 

Aminotransferase (ALT/SGPT) 51, Alkaline Phosphatase 54, Lactate Dehydrogenase 

425H, Total Creatine Kinase 68, Troponin I < 0.02, NT-Pro-B-Type Natriuretic 

Peptide 45, Total Protein 7.0, Albumin 2.6L, Albumin/Globulin Ratio 0.6


CBC/BMP


Laboratory Tests


10/17/21 06:13








Microbiology





Microbiology


10/11/21 Blood Culture - Final, Complete


           NO GROWTH AFTER 5 DAYS


10/11/21 Blood Culture - Final, Complete


           NO GROWTH AFTER 5 DAYS











SHARRI GERARDO M.D.           Oct 17, 2021 14:27

## 2021-10-18 VITALS — SYSTOLIC BLOOD PRESSURE: 116 MMHG | DIASTOLIC BLOOD PRESSURE: 66 MMHG

## 2021-10-18 VITALS — OXYGEN SATURATION: 91 %

## 2021-10-18 VITALS — OXYGEN SATURATION: 93 %

## 2021-10-18 VITALS — DIASTOLIC BLOOD PRESSURE: 70 MMHG | OXYGEN SATURATION: 94 % | SYSTOLIC BLOOD PRESSURE: 127 MMHG

## 2021-10-18 VITALS — SYSTOLIC BLOOD PRESSURE: 122 MMHG | DIASTOLIC BLOOD PRESSURE: 84 MMHG

## 2021-10-18 VITALS — OXYGEN SATURATION: 94 %

## 2021-10-18 LAB
BUN SERPL-MCNC: 18 MG/DL (ref 7–18)
CALCIUM SERPL-MCNC: 8.8 MG/DL (ref 8.5–10.1)
CHLORIDE SERPL-SCNC: 105 MEQ/L (ref 98–107)
CO2 SERPL-SCNC: 25 MEQ/L (ref 21–32)
CREAT SERPL-MCNC: 0.73 MG/DL (ref 0.7–1.3)
GFR SERPL CREATININE-BSD FRML MDRD: > 60 ML/MIN/{1.73_M2} (ref 60–?)
GLUCOSE SERPL-MCNC: 91 MG/DL (ref 70–100)
MAGNESIUM SERPL-MCNC: 2.1 MG/DL (ref 1.8–2.4)
PHOSPHATE SERPL-MCNC: 3.2 MG/DL (ref 2.5–4.9)
POTASSIUM SERPL-SCNC: 4.1 MEQ/L (ref 3.5–5.1)
SODIUM SERPL-SCNC: 135 MEQ/L (ref 136–145)

## 2021-10-18 RX ADMIN — Medication SCH UNITS: at 09:15

## 2021-10-18 RX ADMIN — OXYCODONE HYDROCHLORIDE AND ACETAMINOPHEN SCH MG: 500 TABLET ORAL at 09:15

## 2021-10-18 RX ADMIN — Medication SCH MG: at 09:15

## 2021-10-18 RX ADMIN — MULTIPLE VITAMINS W/ MINERALS TAB SCH TAB: TAB at 09:15

## 2021-10-18 RX ADMIN — BARICITINIB SCH MG: 2 TABLET, FILM COATED ORAL at 20:51

## 2021-10-18 RX ADMIN — DEXAMETHASONE SODIUM PHOSPHATE SCH MG: 4 INJECTION, SOLUTION INTRAMUSCULAR; INTRAVENOUS at 09:14

## 2021-10-18 RX ADMIN — ENOXAPARIN SODIUM SCH MG: 40 INJECTION SUBCUTANEOUS at 09:14

## 2021-10-18 NOTE — IPNPDOC
Subjective


Date Seen


The patient was seen on 10/18/21.





Subjective


Chief Complaint/HPI


Patient was seen and examined at bedside this morning. He was asking when he 

would be able to go home. There has been sig. improvement in his respiratory 

status and he reports being able to get out bed today and ambulate to the 

restroom w/o difficulty. He denied chest pain, abd pain, n/v, problems w/ 

urination and bowel movements.





Objective


Physical Examination


Other physical findings


General: Lying in bed, no acute distress


Head/Neck/Throat: Trachea midline, mucous membranes moist


Eyes: Sclera anicteric, no erythema or discharge appreciated bilateral


Thorax: On 3 L nasal cannula normal respiratory effort, lungs clear to 

auscultation bilaterally, no wheezes/rales/rhonchi


Cardiovascular: Normal rate, regular rhythm, normal S1, S2; no S3, S4, 

rubs/gallops/murmurs


Abdomen: Bowel sounds present, soft/nontender/nondistended


Genitourinary: No CVA tenderness, no Paz in place


Musculoskeletal: Moving all extremities, no edema


Skin: Warm, dry


Neurologic: AAOx3, speech fluent and goal-directed, no focal deficits, grossly 

intact





Assessment /Plan


Assessment


#Acute hypoxic respiratory failure


-Improving. Secondary to Covid pneumonia.  Continue with IV steroids and 

baricitinib.  Completed remdesivir.


-oxygen protocol with sp02 goal of 94 to 95%.  





#Hypoxia


-As above





#HTN 


-Better controlled after being started on lisinopril. 





#Seasonal allergies


-Albuterol as needed





#Obesity


-This complicates care.





#DVT prophylaxis


-Lovenox





Plan/VTE


VTE Prophylaxis Ordered?:  Yes





VS, I&O, 24H, [facility]


Vital Signs/I&O





Vital Signs








  Date Time  Temp Pulse Resp B/P (MAP) Pulse Ox O2 Delivery O2 Flow Rate FiO2


 


10/18/21 14:00 92.0 74 18 116/66 (83) 92 Nasal Cannula 3.0 


 


10/17/21 06:00        50














I&O- Last 24 Hours up to 6 AM 


 


 10/18/21





 05:59


 


Intake Total 1500 ml


 


Balance 1500 ml











Laboratory Data


24H LABS


Laboratory Tests 2


10/18/21 07:59: 


Anion Gap 5L, Glomerular Filtration Rate > 60.0, Calcium Level 8.8, Phosphorus 

Level 3.2, Magnesium Level 2.1


CBC/BMP


Laboratory Tests


10/18/21 07:59








Microbiology





Microbiology


10/11/21 Blood Culture - Final, Complete


           NO GROWTH AFTER 5 DAYS


10/11/21 Blood Culture - Final, Complete


           NO GROWTH AFTER 5 DAYS











SHARRI GERARDO M.D.           Oct 18, 2021 16:25

## 2021-10-19 VITALS — DIASTOLIC BLOOD PRESSURE: 63 MMHG | SYSTOLIC BLOOD PRESSURE: 117 MMHG

## 2021-10-19 VITALS — OXYGEN SATURATION: 96 %

## 2021-10-19 VITALS — OXYGEN SATURATION: 97 %

## 2021-10-19 VITALS — SYSTOLIC BLOOD PRESSURE: 117 MMHG | DIASTOLIC BLOOD PRESSURE: 63 MMHG

## 2021-10-19 VITALS — OXYGEN SATURATION: 95 %

## 2021-10-19 LAB
BUN SERPL-MCNC: 18 MG/DL (ref 7–18)
CALCIUM SERPL-MCNC: 8.5 MG/DL (ref 8.5–10.1)
CHLORIDE SERPL-SCNC: 104 MEQ/L (ref 98–107)
CO2 SERPL-SCNC: 26 MEQ/L (ref 21–32)
CREAT SERPL-MCNC: 0.81 MG/DL (ref 0.7–1.3)
GFR SERPL CREATININE-BSD FRML MDRD: > 60 ML/MIN/{1.73_M2} (ref 60–?)
GLUCOSE SERPL-MCNC: 101 MG/DL (ref 70–100)
HCT VFR BLD AUTO: 42.4 % (ref 42–52)
HGB BLD-MCNC: 15.2 G/DL (ref 13.5–17.5)
MAGNESIUM SERPL-MCNC: 2.3 MG/DL (ref 1.8–2.4)
MCH RBC QN AUTO: 33.8 PG (ref 27–33)
MCHC RBC AUTO-ENTMCNC: 35.8 G/DL (ref 32–36.5)
MCV RBC AUTO: 94.2 FL (ref 80–96)
PLATELET # BLD AUTO: 339 10^3/UL (ref 150–450)
POTASSIUM SERPL-SCNC: 4.4 MEQ/L (ref 3.5–5.1)
RBC # BLD AUTO: 4.5 10^6/UL (ref 4.3–6.1)
SODIUM SERPL-SCNC: 134 MEQ/L (ref 136–145)
WBC # BLD AUTO: 11.1 10^3/UL (ref 4–10)

## 2021-10-19 RX ADMIN — Medication SCH MG: at 08:40

## 2021-10-19 RX ADMIN — Medication SCH UNITS: at 08:39

## 2021-10-19 RX ADMIN — MULTIPLE VITAMINS W/ MINERALS TAB SCH TAB: TAB at 08:40

## 2021-10-19 RX ADMIN — OXYCODONE HYDROCHLORIDE AND ACETAMINOPHEN SCH MG: 500 TABLET ORAL at 08:39

## 2021-10-19 RX ADMIN — DEXAMETHASONE SODIUM PHOSPHATE SCH MG: 4 INJECTION, SOLUTION INTRAMUSCULAR; INTRAVENOUS at 08:40

## 2021-10-19 RX ADMIN — ENOXAPARIN SODIUM SCH MG: 40 INJECTION SUBCUTANEOUS at 08:39

## 2021-10-22 NOTE — DS.PDOC
Discharge Summary


General


Date of Admission


Oct 11, 2021 at 19:19


Date of Discharge


10/19/21





Discharge Summary


PROCEDURES PERFORMED DURING STAY: [None].





DISCHARGE DIAGNOSES:


COVID Pneumonia


Acute hypoxic respiratory failure still needing 3 L oxygen


Hypertension


Morbid Obesity





COMPLICATIONS/CHIEF COMPLAINT: Pneumonia To Covid-19, Sepsis.





HOSPITAL COURSE:  is a pleasant 42yo male with notable PMHx of likely 

undiagnosed HTN, obesity, and recent Covid positive dx, who presented to the [facility]

 ED on the afternoon of 10/11/21 for the chief complaint of worsening shortness 

of breath. The patient's recent story dates back to 10/2 when he his fiance 

received a Covid positive test result. During the overnight into 10/3, the 

patient began to become symptomatic in the form of a dry cough, sinus 

congestion, general fatigue, dyspnea on exertion, and headache. He presented to 

the [facility] urgent care on 10/3 and reportedly received a negative PCR Covid 

test at that time. Over the intervening week, the patient's symptoms worsened as

 his cough remained nonproductive, but became more frequent causing intermittent

 episodes of coughing fits. The intensity of his shortness of breath increases 

well, and again was most prominent with activity. He also had a intermittent 

fever that began around 10/710/8, that was usually able to be controlled by 

ibuprofen and Tylenol. Due to his worsening dyspnea on exertion, the patient 

presented to the ED initially on 10/9; it was at this point that he first tested

 positive for Covid via PCR.  The patient was saturating at 98% on room air per 

reports and did not desaturate significantly with ambulation test.  A chest x-

ray did not show any consolidation or infiltrate.  The patient was hypertensive 

(175/103) and tachycardic (HR [id number]).  A CTA of the chest was done in the s

etting of his dyspnea on exertion and tachycardia, which ruled out pulmonary 

embolus, but did confirm a viral pneumonitis.  After the CTA, patient's 

pressures improved as did his anxiety.  The decision was made at that time for 

patient to be discharged home from the ED, but instructed to return for 

monoclonal antibody administration on 10/11/21.  After the patient returned home

 on 10/9/21 evening, he reported an improvement in his breathing and that his 

fever had broken.  On (10/10), patient seemed to be doing okay, while making 

concerted effort to rest and push oral fluids.  He still had a cough and general

 malaise but his oxygen was consistently staying 93-94% on room air.  As as the 

evening progressed on 10/10/21 , patient's cough worsened and around 5 AM  on 

10/11/21, he reported significant dyspnea while getting out of bed.  He had an 

episode of nonbloody emesis and had what felt to be a minor fever.  Prior to 

coming in for his 10:30 AM infusion, the patient had some shortness of breath 

while showering and ambulating.  When he arrived for the infusion, 

administration was delayed slightly until his hypoxia improved.  While waiting 

for the infusion, patient had a second episode of nonbloody emesis.  Patient 

tolerated his monoclonal's without any significant issue and returned home.  

After nap, patient woke up around 4:15 in the afternoon and reportedly his 

oxygen saturation on room air was 86% while lying down, improving to 91% when he

 was prone.  With the considerable desaturation on room air as well as his 

continued dyspnea on exertion, the patient was driven back to the ED by his 

stepdaughter around 5 PM of 10/11/21.





In the ED, patient had an elevated temperature of 100.2 and was saturating at 

90% room air.  Per reports from the ED he desaturated to 81-82% on his 

ambulation test.  He had no leukocytosis, and unremarkable lactic, but had 

significantly elevated D-dimer and fibrinogen.  A chest x-ray showed an increase

 in multifocal opacities consistent with Covid pulmonary disease.  Patient was 

administered duo nebs every 20 minutes as needed for shortness of breath as well

 as a single dose of 6 mg IV dexamethasone.  He was subsequently admitted under 

the care of the hospitalist service primarily for acute hypoxic respiratory 

failure secondary to novel coronavirus pneumonia. Patient's oxygenation rapidly 

worsened needing 15 L  by nonrebreathing mask for several days. He was treated 

with dexamethasone, remdesevir and olumiant. Then slowly his oxygenation 

improved and at present he is down to 3 l of oxygen . He is being discharged 

home in a stable condition with home oxygen and home care set up. 





DISCHARGE MEDICATIONS: Please see below.


 


ALLERGIES: Please see below.





PHYSICAL EXAMINATION ON DISCHARGE:


VITAL SIGNS: Please see below.


General: Lying in bed, no acute distress


Head/Neck/Throat: Trachea midline, mucous membranes moist


Eyes: Sclera anicteric, no erythema or discharge appreciated bilateral


Thorax: On 3 L nasal cannula normal respiratory effort, lungs clear to 

auscultation bilaterally, no wheezes/rales/rhonchi


Cardiovascular: Normal rate, regular rhythm, normal S1, S2; no 

rubs/gallops/murmurs


Abdomen: Bowel sounds present, soft/nontender/nondistended


Genitourinary: No CVA tenderness, no Paz in place


Neurologic: AAOx3, speech fluent and goal-directed, no focal deficits, grossly 

intact





LABORATORY DATA: Please see below.





ACTIVITY: [As tolerated].





DIET: Regular





DISPOSITION: 01 Home, Self-Care.





DISCHARGE INSTRUCTIONS:


PMD in 1 week


Use oxygen @ 3L/ min





DISCHARGE CONDITION: [Stable].





TIME SPENT ON DISCHARGE: 40 minutes.





Vital Signs/I&Os





Vital Signs








  Date Time  Temp Pulse Resp B/P (MAP) Pulse Ox O2 Delivery O2 Flow Rate FiO2


 


10/19/21 08:39    117/63    


 


10/19/21 08:00     97 Nasal Cannula 3.0 


 


10/19/21 05:40 97.3 58 18     


 


10/17/21 06:00        50











Discharge Medications


Scheduled


Ascorbic Acid (Vitamin C) 500 Mg Capsule, 500 MG PO DAILY, (Reported)


Aspirin (Aspirin EC) 81 Mg Tablet.dr, 1 TAB PO DAILY for pain


Cholecalciferol (Vitamin D3) (Vitamin D3) 1,000 Unit Tablet, 1,000 UNITS PO 

DAILY, (Reported)


Multivitamins (Thera M Plus Tablet) 1 Each Tablet, 1 TAB PO DAILY, (Reported)


Prednisone (Prednisone) 10 Mg Tablet, 10 MG PO TAPER


   Take 4 tabs daily x 3 days, then 3 tabs daily x 3 days, then 2 tabs daily x 3

 days, then 1 tab daily x 3 days and stop 


Zinc Sulfate (Zinc Sulfate) 220 Mg Capsule, 220 MG PO DAILY, (Reported)





Scheduled PRN


Albuterol Sulfate (Albuterol Sulfate Hfa) 8.5 Gm Hfa.aer.ad, 1 PUFF INH QID PRN 

for SOB/WHEEZING, (Reported)





Allergies


Coded Allergies:  


     No Known Allergies (Unverified , 6/2/17)











Elenita Villalobos MD                   Oct 22, 2021 06:30

## 2022-12-28 ENCOUNTER — HOSPITAL ENCOUNTER (OUTPATIENT)
Dept: HOSPITAL 53 - M CARPUL | Age: 42
End: 2022-12-28
Attending: STUDENT IN AN ORGANIZED HEALTH CARE EDUCATION/TRAINING PROGRAM
Payer: COMMERCIAL

## 2022-12-28 DIAGNOSIS — R06.2: Primary | ICD-10-CM

## 2022-12-28 PROCEDURE — 94070 EVALUATION OF WHEEZING: CPT

## 2023-02-23 ENCOUNTER — HOSPITAL ENCOUNTER (OUTPATIENT)
Dept: HOSPITAL 53 - M RAD | Age: 43
End: 2023-02-23
Attending: OTOLARYNGOLOGY
Payer: COMMERCIAL

## 2023-02-23 DIAGNOSIS — R49.0: Primary | ICD-10-CM

## 2023-02-23 DIAGNOSIS — R13.10: Primary | ICD-10-CM

## 2023-06-05 ENCOUNTER — HOSPITAL ENCOUNTER (OUTPATIENT)
Dept: HOSPITAL 53 - M OPP | Age: 43
Discharge: HOME | End: 2023-06-05
Attending: INTERNAL MEDICINE
Payer: COMMERCIAL

## 2023-06-05 VITALS — DIASTOLIC BLOOD PRESSURE: 75 MMHG | OXYGEN SATURATION: 97 % | SYSTOLIC BLOOD PRESSURE: 134 MMHG

## 2023-06-05 VITALS — BODY MASS INDEX: 44.1 KG/M2 | WEIGHT: 315 LBS | HEIGHT: 71 IN

## 2023-06-05 VITALS — TEMPERATURE: 97.1 F

## 2023-06-05 DIAGNOSIS — R12: ICD-10-CM

## 2023-06-05 DIAGNOSIS — K29.60: Primary | ICD-10-CM

## 2023-06-05 DIAGNOSIS — R13.10: ICD-10-CM

## 2023-09-13 ENCOUNTER — HOSPITAL ENCOUNTER (OUTPATIENT)
Dept: HOSPITAL 53 - M LAB REF | Age: 43
End: 2023-09-13
Attending: STUDENT IN AN ORGANIZED HEALTH CARE EDUCATION/TRAINING PROGRAM
Payer: COMMERCIAL

## 2023-09-13 DIAGNOSIS — R14.0: Primary | ICD-10-CM

## 2023-09-19 LAB
GLIADIN PEPTIDE IGA SER-ACNC: (no result) UNITS
GLIADIN PEPTIDE IGG SER-ACNC: (no result) UNITS